# Patient Record
Sex: MALE | Race: BLACK OR AFRICAN AMERICAN | NOT HISPANIC OR LATINO | Employment: FULL TIME | RURAL
[De-identification: names, ages, dates, MRNs, and addresses within clinical notes are randomized per-mention and may not be internally consistent; named-entity substitution may affect disease eponyms.]

---

## 2022-03-15 ENCOUNTER — OFFICE VISIT (OUTPATIENT)
Dept: OTOLARYNGOLOGY | Facility: CLINIC | Age: 47
End: 2022-03-15
Payer: COMMERCIAL

## 2022-03-15 VITALS — WEIGHT: 295 LBS | HEIGHT: 71 IN | BODY MASS INDEX: 41.3 KG/M2

## 2022-03-15 DIAGNOSIS — K11.8 PAROTID MASS: Primary | ICD-10-CM

## 2022-03-15 PROCEDURE — 1160F RVW MEDS BY RX/DR IN RCRD: CPT | Mod: CPTII,,, | Performed by: OTOLARYNGOLOGY

## 2022-03-15 PROCEDURE — 99204 OFFICE O/P NEW MOD 45 MIN: CPT | Mod: S$PBB,,, | Performed by: OTOLARYNGOLOGY

## 2022-03-15 PROCEDURE — 99204 PR OFFICE/OUTPT VISIT, NEW, LEVL IV, 45-59 MIN: ICD-10-PCS | Mod: S$PBB,,, | Performed by: OTOLARYNGOLOGY

## 2022-03-15 PROCEDURE — 3008F BODY MASS INDEX DOCD: CPT | Mod: CPTII,,, | Performed by: OTOLARYNGOLOGY

## 2022-03-15 PROCEDURE — 3008F PR BODY MASS INDEX (BMI) DOCUMENTED: ICD-10-PCS | Mod: CPTII,,, | Performed by: OTOLARYNGOLOGY

## 2022-03-15 PROCEDURE — 1159F PR MEDICATION LIST DOCUMENTED IN MEDICAL RECORD: ICD-10-PCS | Mod: CPTII,,, | Performed by: OTOLARYNGOLOGY

## 2022-03-15 PROCEDURE — 99203 OFFICE O/P NEW LOW 30 MIN: CPT | Mod: PBBFAC | Performed by: OTOLARYNGOLOGY

## 2022-03-15 PROCEDURE — 1160F PR REVIEW ALL MEDS BY PRESCRIBER/CLIN PHARMACIST DOCUMENTED: ICD-10-PCS | Mod: CPTII,,, | Performed by: OTOLARYNGOLOGY

## 2022-03-15 PROCEDURE — 1159F MED LIST DOCD IN RCRD: CPT | Mod: CPTII,,, | Performed by: OTOLARYNGOLOGY

## 2022-03-15 NOTE — H&P (VIEW-ONLY)
Subjective:       Patient ID: Pablo Tabares is a 46 y.o. male.    Chief Complaint: Mass (Right sided neck mass. Pt states right neck mass has been there for years, denies pain to neck area. )    HPI  Review of Systems   HENT: Positive for facial swelling.    All other systems reviewed and are negative.      Objective:      Physical Exam  General: NAD  Head: Normocephalic, atraumatic, no facial asymmetry/normal strength,Right parotid mass 4 cm superficial Ears: Both auricules normal in appearance, w/o deformities tympanic membranes normal external auditory canals normal  Nose: External nose w/o deformities normal turbinates no drainage or inflammation  Oral Cavity: Lips, gums, floor of mouth, tongue hard palate, and buccal mucosa without mass/lesion  Oropharynx: Mucosa pink and moist, soft palate, posterior pharynx and oropharyngeal wall without mass/lesion  Neck: Supple, symmetric, trachea midline, no palpable mass/lesion, no palpable cervical lymphadenopathy  Skin: Warm and dry, no concerning lesions  Respiratory: Respirations even, unlabored    Assessment:       1. Parotid mass        Plan:       Excision in OR

## 2022-03-29 ENCOUNTER — ANESTHESIA (OUTPATIENT)
Dept: SURGERY | Facility: HOSPITAL | Age: 47
End: 2022-03-29
Payer: COMMERCIAL

## 2022-03-29 ENCOUNTER — ANESTHESIA EVENT (OUTPATIENT)
Dept: SURGERY | Facility: HOSPITAL | Age: 47
End: 2022-03-29
Payer: COMMERCIAL

## 2022-03-29 ENCOUNTER — HOSPITAL ENCOUNTER (OUTPATIENT)
Facility: HOSPITAL | Age: 47
Discharge: HOME | End: 2022-03-29
Attending: OTOLARYNGOLOGY | Admitting: OTOLARYNGOLOGY
Payer: COMMERCIAL

## 2022-03-29 VITALS
WEIGHT: 307 LBS | BODY MASS INDEX: 42.98 KG/M2 | OXYGEN SATURATION: 93 % | TEMPERATURE: 98 F | HEIGHT: 71 IN | SYSTOLIC BLOOD PRESSURE: 113 MMHG | RESPIRATION RATE: 16 BRPM | DIASTOLIC BLOOD PRESSURE: 68 MMHG | HEART RATE: 69 BPM

## 2022-03-29 DIAGNOSIS — K11.8 PAROTID MASS: Primary | ICD-10-CM

## 2022-03-29 PROCEDURE — D9220A PRA ANESTHESIA: Mod: ANES,,, | Performed by: ANESTHESIOLOGY

## 2022-03-29 PROCEDURE — 36000708 HC OR TIME LEV III 1ST 15 MIN: Performed by: OTOLARYNGOLOGY

## 2022-03-29 PROCEDURE — 37000009 HC ANESTHESIA EA ADD 15 MINS: Performed by: OTOLARYNGOLOGY

## 2022-03-29 PROCEDURE — 42420 PR EXC PAROTD,TOTAL,DISSECT 5TH NERV: ICD-10-PCS | Mod: RT,,, | Performed by: OTOLARYNGOLOGY

## 2022-03-29 PROCEDURE — 88307 SURGICAL PATHOLOGY: ICD-10-PCS | Mod: 26,,, | Performed by: PATHOLOGY

## 2022-03-29 PROCEDURE — D9220A PRA ANESTHESIA: ICD-10-PCS | Mod: CRNA,,, | Performed by: NURSE ANESTHETIST, CERTIFIED REGISTERED

## 2022-03-29 PROCEDURE — 36000709 HC OR TIME LEV III EA ADD 15 MIN: Performed by: OTOLARYNGOLOGY

## 2022-03-29 PROCEDURE — 71000033 HC RECOVERY, INTIAL HOUR: Performed by: OTOLARYNGOLOGY

## 2022-03-29 PROCEDURE — 27201423 OPTIME MED/SURG SUP & DEVICES STERILE SUPPLY: Performed by: OTOLARYNGOLOGY

## 2022-03-29 PROCEDURE — 37000008 HC ANESTHESIA 1ST 15 MINUTES: Performed by: OTOLARYNGOLOGY

## 2022-03-29 PROCEDURE — 25000003 PHARM REV CODE 250: Performed by: OTOLARYNGOLOGY

## 2022-03-29 PROCEDURE — 27000177 HC AIRWAY, LARYNGEAL MASK: Performed by: ANESTHESIOLOGY

## 2022-03-29 PROCEDURE — 71000015 HC POSTOP RECOV 1ST HR: Performed by: OTOLARYNGOLOGY

## 2022-03-29 PROCEDURE — 63600175 PHARM REV CODE 636 W HCPCS: Performed by: NURSE ANESTHETIST, CERTIFIED REGISTERED

## 2022-03-29 PROCEDURE — 42420 EXCISE PAROTID GLAND/LESION: CPT | Mod: RT,,, | Performed by: OTOLARYNGOLOGY

## 2022-03-29 PROCEDURE — 88307 TISSUE EXAM BY PATHOLOGIST: CPT | Mod: 26,,, | Performed by: PATHOLOGY

## 2022-03-29 PROCEDURE — 63600175 PHARM REV CODE 636 W HCPCS: Performed by: ANESTHESIOLOGY

## 2022-03-29 PROCEDURE — 27000260 *HC AIRWAY ORAL: Performed by: ANESTHESIOLOGY

## 2022-03-29 PROCEDURE — D9220A PRA ANESTHESIA: Mod: CRNA,,, | Performed by: NURSE ANESTHETIST, CERTIFIED REGISTERED

## 2022-03-29 PROCEDURE — 27000655: Performed by: ANESTHESIOLOGY

## 2022-03-29 PROCEDURE — 25000003 PHARM REV CODE 250: Performed by: NURSE ANESTHETIST, CERTIFIED REGISTERED

## 2022-03-29 PROCEDURE — D9220A PRA ANESTHESIA: ICD-10-PCS | Mod: ANES,,, | Performed by: ANESTHESIOLOGY

## 2022-03-29 PROCEDURE — 88307 TISSUE EXAM BY PATHOLOGIST: CPT | Mod: SUR | Performed by: OTOLARYNGOLOGY

## 2022-03-29 RX ORDER — PROPOFOL 10 MG/ML
VIAL (ML) INTRAVENOUS
Status: DISCONTINUED | OUTPATIENT
Start: 2022-03-29 | End: 2022-03-29

## 2022-03-29 RX ORDER — HYDROCODONE BITARTRATE AND ACETAMINOPHEN 7.5; 325 MG/1; MG/1
1 TABLET ORAL EVERY 6 HOURS PRN
Status: DISCONTINUED | OUTPATIENT
Start: 2022-03-29 | End: 2022-03-29 | Stop reason: HOSPADM

## 2022-03-29 RX ORDER — OXYCODONE HYDROCHLORIDE 5 MG/1
5 TABLET ORAL
Status: DISCONTINUED | OUTPATIENT
Start: 2022-03-29 | End: 2022-03-29 | Stop reason: HOSPADM

## 2022-03-29 RX ORDER — SODIUM CHLORIDE, SODIUM LACTATE, POTASSIUM CHLORIDE, CALCIUM CHLORIDE 600; 310; 30; 20 MG/100ML; MG/100ML; MG/100ML; MG/100ML
INJECTION, SOLUTION INTRAVENOUS CONTINUOUS
Status: DISCONTINUED | OUTPATIENT
Start: 2022-03-29 | End: 2022-03-29 | Stop reason: HOSPADM

## 2022-03-29 RX ORDER — HYDROMORPHONE HYDROCHLORIDE 2 MG/ML
0.5 INJECTION, SOLUTION INTRAMUSCULAR; INTRAVENOUS; SUBCUTANEOUS EVERY 5 MIN PRN
Status: DISCONTINUED | OUTPATIENT
Start: 2022-03-29 | End: 2022-03-29 | Stop reason: HOSPADM

## 2022-03-29 RX ORDER — ONDANSETRON 2 MG/ML
INJECTION INTRAMUSCULAR; INTRAVENOUS
Status: DISCONTINUED | OUTPATIENT
Start: 2022-03-29 | End: 2022-03-29

## 2022-03-29 RX ORDER — LIDOCAINE HYDROCHLORIDE AND EPINEPHRINE 10; 10 MG/ML; UG/ML
INJECTION, SOLUTION INFILTRATION; PERINEURAL
Status: DISCONTINUED | OUTPATIENT
Start: 2022-03-29 | End: 2022-03-29 | Stop reason: HOSPADM

## 2022-03-29 RX ORDER — DIPHENHYDRAMINE HYDROCHLORIDE 50 MG/ML
25 INJECTION INTRAMUSCULAR; INTRAVENOUS EVERY 6 HOURS PRN
Status: DISCONTINUED | OUTPATIENT
Start: 2022-03-29 | End: 2022-03-29 | Stop reason: HOSPADM

## 2022-03-29 RX ORDER — FENTANYL CITRATE 50 UG/ML
INJECTION, SOLUTION INTRAMUSCULAR; INTRAVENOUS
Status: DISCONTINUED | OUTPATIENT
Start: 2022-03-29 | End: 2022-03-29

## 2022-03-29 RX ORDER — ONDANSETRON 2 MG/ML
4 INJECTION INTRAMUSCULAR; INTRAVENOUS DAILY PRN
Status: DISCONTINUED | OUTPATIENT
Start: 2022-03-29 | End: 2022-03-29 | Stop reason: HOSPADM

## 2022-03-29 RX ORDER — DEXAMETHASONE SODIUM PHOSPHATE 4 MG/ML
INJECTION, SOLUTION INTRA-ARTICULAR; INTRALESIONAL; INTRAMUSCULAR; INTRAVENOUS; SOFT TISSUE
Status: DISCONTINUED | OUTPATIENT
Start: 2022-03-29 | End: 2022-03-29

## 2022-03-29 RX ORDER — LIDOCAINE HYDROCHLORIDE 20 MG/ML
INJECTION, SOLUTION EPIDURAL; INFILTRATION; INTRACAUDAL; PERINEURAL
Status: DISCONTINUED | OUTPATIENT
Start: 2022-03-29 | End: 2022-03-29

## 2022-03-29 RX ORDER — SODIUM CHLORIDE, SODIUM LACTATE, POTASSIUM CHLORIDE, CALCIUM CHLORIDE 600; 310; 30; 20 MG/100ML; MG/100ML; MG/100ML; MG/100ML
125 INJECTION, SOLUTION INTRAVENOUS CONTINUOUS
Status: DISCONTINUED | OUTPATIENT
Start: 2022-03-29 | End: 2022-03-29 | Stop reason: HOSPADM

## 2022-03-29 RX ORDER — MEPERIDINE HYDROCHLORIDE 25 MG/ML
25 INJECTION INTRAMUSCULAR; INTRAVENOUS; SUBCUTANEOUS EVERY 10 MIN PRN
Status: DISCONTINUED | OUTPATIENT
Start: 2022-03-29 | End: 2022-03-29 | Stop reason: HOSPADM

## 2022-03-29 RX ORDER — CEFAZOLIN SODIUM 1 G/3ML
INJECTION, POWDER, FOR SOLUTION INTRAMUSCULAR; INTRAVENOUS
Status: DISCONTINUED | OUTPATIENT
Start: 2022-03-29 | End: 2022-03-29

## 2022-03-29 RX ORDER — MIDAZOLAM HYDROCHLORIDE 1 MG/ML
INJECTION INTRAMUSCULAR; INTRAVENOUS
Status: DISCONTINUED | OUTPATIENT
Start: 2022-03-29 | End: 2022-03-29

## 2022-03-29 RX ORDER — MORPHINE SULFATE 10 MG/ML
4 INJECTION INTRAMUSCULAR; INTRAVENOUS; SUBCUTANEOUS EVERY 5 MIN PRN
Status: DISCONTINUED | OUTPATIENT
Start: 2022-03-29 | End: 2022-03-29 | Stop reason: HOSPADM

## 2022-03-29 RX ADMIN — CEFAZOLIN 3 G: 1 INJECTION, POWDER, FOR SOLUTION INTRAMUSCULAR; INTRAVENOUS; PARENTERAL at 07:03

## 2022-03-29 RX ADMIN — FENTANYL CITRATE 50 MCG: 50 INJECTION INTRAMUSCULAR; INTRAVENOUS at 07:03

## 2022-03-29 RX ADMIN — SODIUM CHLORIDE, POTASSIUM CHLORIDE, SODIUM LACTATE AND CALCIUM CHLORIDE: 600; 310; 30; 20 INJECTION, SOLUTION INTRAVENOUS at 07:03

## 2022-03-29 RX ADMIN — MIDAZOLAM 2 MG: 1 INJECTION INTRAMUSCULAR; INTRAVENOUS at 07:03

## 2022-03-29 RX ADMIN — ONDANSETRON 4 MG: 2 INJECTION INTRAMUSCULAR; INTRAVENOUS at 07:03

## 2022-03-29 RX ADMIN — LIDOCAINE HYDROCHLORIDE 60 MG: 20 INJECTION, SOLUTION INTRAVENOUS at 07:03

## 2022-03-29 RX ADMIN — DEXAMETHASONE SODIUM PHOSPHATE 12 MG: 4 INJECTION, SOLUTION INTRA-ARTICULAR; INTRALESIONAL; INTRAMUSCULAR; INTRAVENOUS; SOFT TISSUE at 07:03

## 2022-03-29 RX ADMIN — PROPOFOL 200 MG: 10 INJECTION, EMULSION INTRAVENOUS at 07:03

## 2022-03-29 NOTE — PROGRESS NOTES
REC'ED TO RR WITH ORAL AIRWAY IN PLACE. O2 VIA FM. O2 SAT 92%. CO2 MONITORING IN PROGRESS MID 40'S. OP-SITE RIGHT BELOW LEFT EAR APPROXIMATED WITH LIQUID ADHESIVE DRESSING.  IV INFUSING WELL LEFT HAND 20G. CATH. OBSERVING CLOSELY. SEE FLOW SHEET.

## 2022-03-29 NOTE — TRANSFER OF CARE
"Anesthesia Transfer of Care Note    Patient: Pablo Tabares    Procedure(s) Performed: Procedure(s) (LRB):  EXCISION, PAROTID GLAND (Right)    Patient location: PACU    Anesthesia Type: general    Transport from OR: Transported from OR on 6-10 L/min O2 by face mask with adequate spontaneous ventilation    Post pain: adequate analgesia    Post assessment: no apparent anesthetic complications    Post vital signs: stable    Level of consciousness: awake and alert    Nausea/Vomiting: no nausea/vomiting    Complications: none    Transfer of care protocol was followed      Last vitals:   Visit Vitals  BP (!) 155/96   Pulse 78   Temp 36.7 °C (98.1 °F) (Oral)   Resp 16   Ht 5' 11" (1.803 m)   Wt (!) 139.3 kg (307 lb)   SpO2 (!) 94%   BMI 42.82 kg/m²     "

## 2022-03-29 NOTE — ANESTHESIA PREPROCEDURE EVALUATION
03/29/2022  Pablo Tabares is a 46 y.o., male.      Pre-op Assessment    I have reviewed the Patient Summary Reports.     I have reviewed the Nursing Notes. I have reviewed the NPO Status.   I have reviewed the Medications.     Review of Systems  Anesthesia Hx:  No problems with previous Anesthesia    Social:  No Alcohol Use, Smoker    Hematology/Oncology:  Hematology Normal   Oncology Normal     EENT/Dental:EENT/Dental Normal   Cardiovascular:  Cardiovascular Normal     Pulmonary:  Pulmonary Normal    Renal/:  Renal/ Normal     Hepatic/GI:  Hepatic/GI Normal    Musculoskeletal:  Musculoskeletal Normal    Neurological:  Neurology Normal    Endocrine:  Endocrine Normal  Morbid Obesity / BMI > 40  Dermatological:  Skin Normal    Psych:  Psychiatric Normal           Physical Exam  General: Well nourished    Airway:  Mallampati: III / III  Mouth Opening: Normal  TM Distance: > 6 cm  Tongue: Normal  Neck ROM: Normal ROM    Chest/Lungs:  Clear to auscultation, Normal Respiratory Rate    Heart:  Rate: Normal  Rhythm: Regular Rhythm        Anesthesia Plan  Type of Anesthesia, risks & benefits discussed:    Anesthesia Type: Gen ETT  Intra-op Monitoring Plan: Standard ASA Monitors  Post Op Pain Control Plan: multimodal analgesia  Induction:  IV  Informed Consent: Informed consent signed with the Patient and all parties understand the risks and agree with anesthesia plan.  All questions answered. Patient consented to blood products? Yes  ASA Score: 2  Day of Surgery Review of History & Physical: H&P Update referred to the surgeon/provider.I have interviewed and examined the patient. I have reviewed the patient's H&P dated: There are no significant changes. H&P completed by Anesthesiologist.    Ready For Surgery From Anesthesia Perspective.     .

## 2022-03-29 NOTE — ANESTHESIA PROCEDURE NOTES
Intubation    Date/Time: 3/29/2022 7:20 AM  Performed by: Emily Charlton CRNA  Authorized by: Jaguar Watson MD     Intubation:     Induction:  Intravenous    Intubated:  Postinduction    Mask Ventilation:  N/a    Attempts:  1    Attempted By:  CRNA    Method of Intubation:  Other (see comments)    Difficult Airway Encountered?: No      Complications:  None    Airway Device:  Supraglottic airway/LMA    Airway Device Size:  5.0    Style/Cuff Inflation:  Cuffed (inflated to minimal occlusive pressure)    Inflation Amount (mL):  10    Placement Verified By:  Capnometry    Complicating Factors:  None    Findings Post-Intubation:  BS equal bilateral and atraumatic/condition of teeth unchanged

## 2022-03-29 NOTE — ANESTHESIA POSTPROCEDURE EVALUATION
Anesthesia Post Evaluation    Patient: Pablo Tabares    Procedure(s) Performed: Procedure(s) (LRB):  EXCISION, PAROTID GLAND (Right)    Final Anesthesia Type: general      Patient location during evaluation: PACU  Patient participation: Yes- Able to Participate  Level of consciousness: awake and sedated  Post-procedure vital signs: reviewed and stable  Pain management: adequate  Airway patency: patent    PONV status at discharge: No PONV  Anesthetic complications: no      Cardiovascular status: blood pressure returned to baseline  Respiratory status: unassisted  Hydration status: euvolemic  Follow-up not needed.          Vitals Value Taken Time   /68 03/29/22 0900   Temp 36.7 °C (98 °F) 03/29/22 0900   Pulse 68 03/29/22 0907   Resp 16 03/29/22 0835   SpO2 94 % 03/29/22 0907   Vitals shown include unvalidated device data.      Event Time   Out of Recovery 08:37:00         Pain/Jose Luis Score: Jose Luis Score: 9 (3/29/2022  8:30 AM)

## 2022-03-29 NOTE — PROGRESS NOTES
RELEASED TO [facility] RN. AWAKE,ALERT. NO C/O PAIN. NO BLEEDING OR SWELLING AT OP-SITE.  V/S 126/65-70-16-97%. NO DISTRESS NOTED.

## 2022-03-29 NOTE — OP NOTE
After general anesthesia the neck was prepped and draped in a sterile fashion. It was injected with 1% xylocaine with epi 1:100,000 a horizontal skin crease incision was made down through platysma muscle the large superficial and deep lobe parotid mass was dissected from the surrounding parotid tissue  the branch of facial nerve was dissected and reflected superiorly and preserved ; the mass was then removed we obtained  meticulous hemostasis it was sent to pathology for permanent section. The patient tolerated the procedure well was taken to  in stable condition.

## 2022-03-31 LAB
DHEA SERPL-MCNC: NORMAL
ESTROGEN SERPL-MCNC: NORMAL PG/ML
INSULIN SERPL-ACNC: NORMAL U[IU]/ML
LAB AP GROSS DESCRIPTION: NORMAL
LAB AP LABORATORY NOTES: NORMAL
T3RU NFR SERPL: NORMAL %

## 2022-04-05 ENCOUNTER — OFFICE VISIT (OUTPATIENT)
Dept: OTOLARYNGOLOGY | Facility: CLINIC | Age: 47
End: 2022-04-05
Payer: COMMERCIAL

## 2022-04-05 VITALS — WEIGHT: 307 LBS | BODY MASS INDEX: 42.98 KG/M2 | HEIGHT: 71 IN

## 2022-04-05 DIAGNOSIS — D11.0 PLEOMORPHIC ADENOMA OF PAROTID GLAND: Primary | ICD-10-CM

## 2022-04-05 PROCEDURE — 99213 OFFICE O/P EST LOW 20 MIN: CPT | Mod: PBBFAC | Performed by: OTOLARYNGOLOGY

## 2022-04-05 PROCEDURE — 99024 POSTOP FOLLOW-UP VISIT: CPT | Mod: ,,, | Performed by: OTOLARYNGOLOGY

## 2022-04-05 PROCEDURE — 1160F RVW MEDS BY RX/DR IN RCRD: CPT | Mod: CPTII,,, | Performed by: OTOLARYNGOLOGY

## 2022-04-05 PROCEDURE — 1159F PR MEDICATION LIST DOCUMENTED IN MEDICAL RECORD: ICD-10-PCS | Mod: CPTII,,, | Performed by: OTOLARYNGOLOGY

## 2022-04-05 PROCEDURE — 1159F MED LIST DOCD IN RCRD: CPT | Mod: CPTII,,, | Performed by: OTOLARYNGOLOGY

## 2022-04-05 PROCEDURE — 3008F PR BODY MASS INDEX (BMI) DOCUMENTED: ICD-10-PCS | Mod: CPTII,,, | Performed by: OTOLARYNGOLOGY

## 2022-04-05 PROCEDURE — 99024 PR POST-OP FOLLOW-UP VISIT: ICD-10-PCS | Mod: ,,, | Performed by: OTOLARYNGOLOGY

## 2022-04-05 PROCEDURE — 1160F PR REVIEW ALL MEDS BY PRESCRIBER/CLIN PHARMACIST DOCUMENTED: ICD-10-PCS | Mod: CPTII,,, | Performed by: OTOLARYNGOLOGY

## 2022-04-05 PROCEDURE — 3008F BODY MASS INDEX DOCD: CPT | Mod: CPTII,,, | Performed by: OTOLARYNGOLOGY

## 2022-04-05 NOTE — PROGRESS NOTES
Subjective:       Patient ID: Pablo Tabares is a 46 y.o. male.    Chief Complaint: Follow-up (Patient is a post op. He states he was sick Thursday and Friday but is now doing well.)    HPI  Review of Systems    Objective:      Physical Exam  Healing well some swelling hematoma   Assessment:       1. Pleomorphic adenoma of parotid gland        Plan:       F/u 6 weeks

## 2022-05-17 ENCOUNTER — OFFICE VISIT (OUTPATIENT)
Dept: OTOLARYNGOLOGY | Facility: CLINIC | Age: 47
End: 2022-05-17
Payer: COMMERCIAL

## 2022-05-17 VITALS — BODY MASS INDEX: 42.98 KG/M2 | HEIGHT: 71 IN | WEIGHT: 307 LBS

## 2022-05-17 DIAGNOSIS — D11.0 PLEOMORPHIC ADENOMA OF PAROTID GLAND: Primary | ICD-10-CM

## 2022-05-17 PROCEDURE — 99024 PR POST-OP FOLLOW-UP VISIT: ICD-10-PCS | Mod: S$PBB,,, | Performed by: OTOLARYNGOLOGY

## 2022-05-17 PROCEDURE — 1160F RVW MEDS BY RX/DR IN RCRD: CPT | Mod: CPTII,,, | Performed by: OTOLARYNGOLOGY

## 2022-05-17 PROCEDURE — 99024 POSTOP FOLLOW-UP VISIT: CPT | Mod: S$PBB,,, | Performed by: OTOLARYNGOLOGY

## 2022-05-17 PROCEDURE — 1160F PR REVIEW ALL MEDS BY PRESCRIBER/CLIN PHARMACIST DOCUMENTED: ICD-10-PCS | Mod: CPTII,,, | Performed by: OTOLARYNGOLOGY

## 2022-05-17 PROCEDURE — 1159F MED LIST DOCD IN RCRD: CPT | Mod: CPTII,,, | Performed by: OTOLARYNGOLOGY

## 2022-05-17 PROCEDURE — 3008F PR BODY MASS INDEX (BMI) DOCUMENTED: ICD-10-PCS | Mod: CPTII,,, | Performed by: OTOLARYNGOLOGY

## 2022-05-17 PROCEDURE — 3008F BODY MASS INDEX DOCD: CPT | Mod: CPTII,,, | Performed by: OTOLARYNGOLOGY

## 2022-05-17 PROCEDURE — 1159F PR MEDICATION LIST DOCUMENTED IN MEDICAL RECORD: ICD-10-PCS | Mod: CPTII,,, | Performed by: OTOLARYNGOLOGY

## 2022-05-17 PROCEDURE — 99213 OFFICE O/P EST LOW 20 MIN: CPT | Mod: PBBFAC | Performed by: OTOLARYNGOLOGY

## 2022-05-17 NOTE — PROGRESS NOTES
Subjective:       Patient ID: Pablo Tabares is a 46 y.o. male.    Chief Complaint: Mass (Right neck mass, pt states it is better, but not completely gone. )    HPI  Review of Systems   Constitutional: Negative for chills, fatigue and fever.   HENT: Negative for facial swelling.          Objective:      Physical Exam  Constitutional:       Appearance: Normal appearance. He is obese.   HENT:      Head: Normocephalic.      Right Ear: Ear canal and external ear normal.      Left Ear: Ear canal and external ear normal.      Nose: Nose normal.      Mouth/Throat:      Lips: Pink.      Mouth: Mucous membranes are moist.      Pharynx: Oropharynx is clear.   Pulmonary:      Effort: Pulmonary effort is normal.   Skin:     General: Skin is warm and dry.   Neurological:      Mental Status: He is alert.   Psychiatric:         Mood and Affect: Mood normal.         Behavior: Behavior is cooperative.       some scar tissue right scar area  Assessment:       Problem List Items Addressed This Visit    None     Visit Diagnoses     Pleomorphic adenoma of parotid gland    -  Primary          Plan:   1. Follow up as needed.when enlarges

## 2022-09-13 ENCOUNTER — CLINICAL SUPPORT (OUTPATIENT)
Dept: PRIMARY CARE CLINIC | Facility: CLINIC | Age: 47
End: 2022-09-13

## 2022-09-13 DIAGNOSIS — Z02.4 ENCOUNTER FOR DEPARTMENT OF TRANSPORTATION (DOT) EXAMINATION FOR DRIVING LICENSE RENEWAL: Primary | ICD-10-CM

## 2022-09-13 PROCEDURE — 99499 UNLISTED E&M SERVICE: CPT | Mod: ,,, | Performed by: NURSE PRACTITIONER

## 2022-09-13 PROCEDURE — 99499 PR PHYSICAL - DOT/CDL: ICD-10-PCS | Mod: ,,, | Performed by: NURSE PRACTITIONER

## 2022-09-13 NOTE — PROGRESS NOTES
Subjective:       Patient ID: Pablo Tabares is a 47 y.o. male.    Chief Complaint: No chief complaint on file.    HPI  Review of Systems      Objective:      Physical Exam    Assessment:       Problem List Items Addressed This Visit    None  Visit Diagnoses       Encounter for Department of Transportation (DOT) examination for driving license renewal    -  Primary            Plan:       See scanned documents in .

## 2024-01-18 ENCOUNTER — OFFICE VISIT (OUTPATIENT)
Dept: FAMILY MEDICINE | Facility: CLINIC | Age: 49
End: 2024-01-18

## 2024-01-18 ENCOUNTER — HOSPITAL ENCOUNTER (OUTPATIENT)
Dept: RADIOLOGY | Facility: HOSPITAL | Age: 49
Discharge: HOME | End: 2024-01-18
Attending: NURSE PRACTITIONER

## 2024-01-18 VITALS
BODY MASS INDEX: 43.4 KG/M2 | SYSTOLIC BLOOD PRESSURE: 144 MMHG | HEART RATE: 90 BPM | WEIGHT: 310 LBS | HEIGHT: 71 IN | TEMPERATURE: 98 F | OXYGEN SATURATION: 97 % | DIASTOLIC BLOOD PRESSURE: 77 MMHG

## 2024-01-18 DIAGNOSIS — S82.831A CLOSED FRACTURE OF DISTAL END OF RIGHT FIBULA, UNSPECIFIED FRACTURE MORPHOLOGY, INITIAL ENCOUNTER: Primary | ICD-10-CM

## 2024-01-18 DIAGNOSIS — M25.571 ACUTE RIGHT ANKLE PAIN: ICD-10-CM

## 2024-01-18 PROCEDURE — 73610 X-RAY EXAM OF ANKLE: CPT | Mod: TC,RT

## 2024-01-18 PROCEDURE — 73610 X-RAY EXAM OF ANKLE: CPT | Mod: 26,RT,, | Performed by: STUDENT IN AN ORGANIZED HEALTH CARE EDUCATION/TRAINING PROGRAM

## 2024-01-18 PROCEDURE — 99203 OFFICE O/P NEW LOW 30 MIN: CPT | Mod: ,,, | Performed by: NURSE PRACTITIONER

## 2024-01-18 RX ORDER — IBUPROFEN 800 MG/1
800 TABLET ORAL EVERY 8 HOURS PRN
Qty: 60 TABLET | Refills: 0 | Status: SHIPPED | OUTPATIENT
Start: 2024-01-18

## 2024-01-18 RX ORDER — HYDROCODONE BITARTRATE AND ACETAMINOPHEN 7.5; 325 MG/1; MG/1
1 TABLET ORAL EVERY 6 HOURS PRN
Qty: 14 TABLET | Refills: 0 | Status: SHIPPED | OUTPATIENT
Start: 2024-01-18 | End: 2024-02-05

## 2024-01-18 NOTE — PROGRESS NOTES
"Subjective:       Patient ID: Pablo Tabares is a 48 y.o. male.    Chief Complaint: Ankle Injury (Pt states that he fell on 01/17 and is now having pain and swelling in R ankle.)    HPI  Review of Systems   Constitutional: Negative.    Respiratory: Negative.     Cardiovascular: Negative.    Musculoskeletal:  Positive for falls and joint pain.          Reviewed family, medical, surgical, and social history.    Objective:      BP (!) 144/77 (BP Location: Right arm, Patient Position: Sitting, BP Method: X-Large (Automatic))   Pulse 90   Temp 98.1 °F (36.7 °C) (Oral)   Ht 5' 11" (1.803 m)   Wt (!) 140.6 kg (310 lb)   SpO2 97%   BMI 43.24 kg/m²   Physical Exam  Vitals and nursing note reviewed.   Constitutional:       General: He is not in acute distress.     Appearance: Normal appearance. He is obese. He is not ill-appearing, toxic-appearing or diaphoretic.   Cardiovascular:      Rate and Rhythm: Normal rate and regular rhythm.      Heart sounds: Normal heart sounds.   Pulmonary:      Effort: Pulmonary effort is normal.      Breath sounds: Normal breath sounds.   Musculoskeletal:      Right ankle: Swelling and ecchymosis present. No deformity or lacerations. Tenderness present over the lateral malleolus. Decreased range of motion. Normal pulse.      Right Achilles Tendon: Normal. No tenderness or defects. Charlton's test negative.      Comments: Pain and swelling to right lateral ankle. Severe pain with ROM. Charlton's test negative. Normal sensation of toes. Normal pulses.    Skin:     General: Skin is warm and dry.      Capillary Refill: Capillary refill takes less than 2 seconds.   Neurological:      Mental Status: He is alert and oriented to person, place, and time.   Psychiatric:         Mood and Affect: Mood normal.         Behavior: Behavior normal.         Thought Content: Thought content normal.         Judgment: Judgment normal.            No visits with results within 1 Day(s) from this visit.   Latest " known visit with results is:   Admission on 03/29/2022, Discharged on 03/29/2022   Component Date Value Ref Range Status    Case Report 03/29/2022    Final                    Value:Surgical Pathology                                Case: [id number]                                   Authorizing Provider:  Hari Mattson MD        Collected:           03/29/2022 07:38 AM          Ordering Location:     [facility], [place]   Received:            03/29/2022 09:48 AM                                 Services                                                                     Pathologist:           Osmar Rodriguez MD                                                       Specimen:    Salivary Gland, right parotid                                                              Final Diagnosis 03/29/2022    Final                    Value:This result contains rich text formatting which cannot be displayed here.    Comments 03/29/2022    Final                    Value:This result contains rich text formatting which cannot be displayed here.    Gross Description 03/29/2022    Final                    Value:This result contains rich text formatting which cannot be displayed here.    Microscopic Description 03/29/2022    Final                    Value:This result contains rich text formatting which cannot be displayed here.    Laboratory Notes 03/29/2022    Final                    Value:This result contains rich text formatting which cannot be displayed here.      Assessment:       1. Closed fracture of distal end of right fibula, unspecified fracture morphology, initial encounter    2. Acute right ankle pain        Plan:       Closed fracture of distal end of right fibula, unspecified fracture morphology, initial encounter  -     Ambulatory referral/consult to Orthopedics; Future; Expected date: 01/25/2024    Acute right ankle pain  -     X-Ray Ankle Complete 3 View Right; Future; Expected date: 01/18/2024  -     ibuprofen  (ADVIL,MOTRIN) 800 MG tablet; Take 1 tablet (800 mg total) by mouth every 8 (eight) hours as needed for Pain.  Dispense: 60 tablet; Refill: 0  -     HYDROcodone-acetaminophen (NORCO) 7.5-325 mg per tablet; Take 1 tablet by mouth every 6 (six) hours as needed for Pain (severe right ankle pain).  Dispense: 14 tablet; Refill: 0    F/U with us if ortho has not called you by Monday at noon  RTC PRN          Risks, benefits, and side effects were discussed with the patient. All questions were answered to the fullest satisfaction of the patient, and pt verbalized understanding and agreement to treatment plan. Pt was to call with any new or worsening symptoms, or present to the ER.

## 2024-01-22 ENCOUNTER — OFFICE VISIT (OUTPATIENT)
Dept: ORTHOPEDICS | Facility: CLINIC | Age: 49
End: 2024-01-22

## 2024-01-22 ENCOUNTER — CLINICAL SUPPORT (OUTPATIENT)
Dept: CARDIOLOGY | Facility: CLINIC | Age: 49
End: 2024-01-22

## 2024-01-22 ENCOUNTER — HOSPITAL ENCOUNTER (OUTPATIENT)
Dept: RADIOLOGY | Facility: HOSPITAL | Age: 49
Discharge: HOME | End: 2024-01-22
Attending: ORTHOPAEDIC SURGERY

## 2024-01-22 DIAGNOSIS — M25.571 ACUTE RIGHT ANKLE PAIN: Primary | ICD-10-CM

## 2024-01-22 DIAGNOSIS — Z01.810 PRE-OPERATIVE CARDIOVASCULAR EXAMINATION: ICD-10-CM

## 2024-01-22 DIAGNOSIS — S82.831A CLOSED FRACTURE OF DISTAL END OF RIGHT FIBULA, UNSPECIFIED FRACTURE MORPHOLOGY, INITIAL ENCOUNTER: ICD-10-CM

## 2024-01-22 DIAGNOSIS — S82.891A CLOSED FRACTURE OF RIGHT ANKLE, INITIAL ENCOUNTER: Primary | ICD-10-CM

## 2024-01-22 DIAGNOSIS — M25.571 ACUTE RIGHT ANKLE PAIN: ICD-10-CM

## 2024-01-22 PROCEDURE — 93010 ELECTROCARDIOGRAM REPORT: CPT | Mod: S$PBB,,, | Performed by: HOSPITALIST

## 2024-01-22 PROCEDURE — 99212 OFFICE O/P EST SF 10 MIN: CPT | Mod: PBBFAC,25

## 2024-01-22 PROCEDURE — 73610 X-RAY EXAM OF ANKLE: CPT | Mod: 26,RT,, | Performed by: ORTHOPAEDIC SURGERY

## 2024-01-22 PROCEDURE — 73610 X-RAY EXAM OF ANKLE: CPT | Mod: TC,RT

## 2024-01-22 PROCEDURE — 99213 OFFICE O/P EST LOW 20 MIN: CPT | Mod: PBBFAC | Performed by: ORTHOPAEDIC SURGERY

## 2024-01-22 PROCEDURE — 99204 OFFICE O/P NEW MOD 45 MIN: CPT | Mod: S$PBB,57,, | Performed by: ORTHOPAEDIC SURGERY

## 2024-01-22 PROCEDURE — 93005 ELECTROCARDIOGRAM TRACING: CPT | Mod: PBBFAC | Performed by: HOSPITALIST

## 2024-01-22 NOTE — PROGRESS NOTES
Radiology Interpretation        Patient Name: Pablo Tabares  Date: 1/22/2024  YOB: 1975  MRN# [id number]        ORDERING DIAGNOSIS:    Encounter Diagnoses   Name Primary?    Closed fracture of distal end of right fibula, unspecified fracture morphology, initial encounter     Closed fracture of right ankle, initial encounter Yes      Three views AP lateral mortise views right ankle skeletally mature individual there is a fracture of the distal fibula there is widening of the mortise displacement of the fracture impression displaced fracture right distal fibula with widening of the mortise               Ian Dorman MD

## 2024-01-22 NOTE — PATIENT INSTRUCTIONS
Your surgery is scheduled at [facility] in [place] for 2024     Pre-Op Testin2024    _______ Lab (Clinic Lab 1st Floor)  _______ Chest X-ray ([facility] 1st Floor)  ___x____ EKG (Clinic 2nd Floor)    Our office will contact you the day before surgery to give you  the arrival time.    *Do NOT eat or drink anything after midnight the night before your surgery.    *Bring all medications in their original bottles.    *Bring anything you may need for an overnight stay.     *Bathe with Hibiclens the night or morning before surgery.    *The morning of your surgery ONLY take blood pressure medications, heart medications, medications for acid reflux, and thyroid medications (the morning dose only).  *Take these medications with a sip of water.    *Do not take Insulin or Diabetic Medications the night before or the morning of your surgery, unless directed otherwise.    *Be sure that you have stopped blood thinners at the appropriate time, as instructed.  (_____ days prior to surgery)    *Bring your C-Pap machine if you have one.    *All jewelry and piercings MUST be removed prior to surgery.    *False eye lashes must be removed prior to surgery.    *Any questions regarding co-pays or deductibles with insurance, please contact the [facility]or[facility] at #[phone].    *For Financial Assistance you may call #[phone] or #[phone].    Thank you for choosing Dr. Ian Dorman for your Orthopedic needs.  We look forward to caring for you. If you have any questions, please contact our office at [phone].

## 2024-01-22 NOTE — PROGRESS NOTES
Clinic Note        CC:   Chief Complaint   Patient presents with    Right Ankle - Pain        Principal problem: Closed fracture of right ankle, initial encounter [S87.380C]     REASON FOR VISIT:       HISTORY:  48-year-old male who sustained a twisting injury to his right ankle 4 days ago was noted to have a fracture was distal fibula he has some widening of the mortise he denies any numbness or tingling      PAST MEDICAL HISTORY: History reviewed. No pertinent past medical history.       PAST SURGICAL HISTORY:   Past Surgical History:   Procedure Laterality Date    EXCISION OF PAROTID GLAND Right 3/29/2022    Procedure: EXCISION, PAROTID GLAND;  Surgeon: Hari Mattson MD;  Location: [facility];  Service: ENT;  Laterality: Right;          ALLERGIES:   Review of patient's allergies indicates:   Allergen Reactions    Shrimp         MEDICATIONS :    Current Outpatient Medications:     HYDROcodone-acetaminophen (NORCO) 7.5-325 mg per tablet, Take 1 tablet by mouth every 6 (six) hours as needed for Pain (severe right ankle pain)., Disp: 14 tablet, Rfl: 0    ibuprofen (ADVIL,MOTRIN) 800 MG tablet, Take 1 tablet (800 mg total) by mouth every 8 (eight) hours as needed for Pain., Disp: 60 tablet, Rfl: 0     SOCIAL HISTORY:   Social History     Socioeconomic History    Marital status: Single   Tobacco Use    Smoking status: Never    Smokeless tobacco: Never   Substance and Sexual Activity    Alcohol use: Not Currently          FAMILY HISTORY: History reviewed. No pertinent family history.       PHYSICAL EXAM:  In general, this is a well-developed, well-nourished male . The patient is alert, oriented and cooperative.      HEENT:  Normocephalic, atraumatic.  Extraocular movements are intact bilaterally.  The oropharynx is benign.       NECK:  Nontender with good range of motion.      LUNGS:  Clear to auscultation bilaterally.      HEART:  Demonstrates a regular rate and rhythm.  No murmurs  appreciated.      ABDOMEN:  Soft, non-tender, non-distended.        EXTREMITIES:  Right lower extremity moves his toes has sensation to touch has palpable pulses tender palpation over his distal fibula.  There is swelling and ecchymosis noted he is tender over his medial malleolus as well no crepitus noted      RADIOGRAPHIC FINDINGS:  X-rays show fracture of the distal fibula with widening of the mortise      IMPRESSION: Closed fracture of right ankle, initial encounter    Closed fracture of distal end of right fibula, unspecified fracture morphology, initial encounter  -     Ambulatory referral/consult to Orthopedics         PLAN:  We discussed treatment options including risks and benefits of surgery.  We are going to plan on doing an open reduction internal fixation of his right ankle.  Will set this up index 1-2 days he started having widening of the mortise and subluxing his ankle this is widening and increasing since his previous x-rays 4 days ago.  There are no Patient Instructions on file for this visit.      No follow-ups on file.         Ian Dorman      (Subject to voice recognition error, transcription service not allowed)

## 2024-01-23 ENCOUNTER — HOSPITAL ENCOUNTER (OUTPATIENT)
Facility: HOSPITAL | Age: 49
Discharge: HOME | End: 2024-01-23
Attending: ORTHOPAEDIC SURGERY | Admitting: ORTHOPAEDIC SURGERY

## 2024-01-23 ENCOUNTER — ANESTHESIA EVENT (OUTPATIENT)
Dept: SURGERY | Facility: HOSPITAL | Age: 49
End: 2024-01-23

## 2024-01-23 ENCOUNTER — ANESTHESIA (OUTPATIENT)
Dept: SURGERY | Facility: HOSPITAL | Age: 49
End: 2024-01-23

## 2024-01-23 VITALS
BODY MASS INDEX: 43.4 KG/M2 | OXYGEN SATURATION: 93 % | WEIGHT: 310 LBS | HEART RATE: 69 BPM | DIASTOLIC BLOOD PRESSURE: 76 MMHG | SYSTOLIC BLOOD PRESSURE: 132 MMHG | HEIGHT: 71 IN | TEMPERATURE: 99 F

## 2024-01-23 DIAGNOSIS — S82.891A CLOSED FRACTURE OF RIGHT ANKLE: ICD-10-CM

## 2024-01-23 PROCEDURE — 27201423 OPTIME MED/SURG SUP & DEVICES STERILE SUPPLY: Performed by: ORTHOPAEDIC SURGERY

## 2024-01-23 PROCEDURE — 63600175 PHARM REV CODE 636 W HCPCS: Performed by: NURSE ANESTHETIST, CERTIFIED REGISTERED

## 2024-01-23 PROCEDURE — D9220A PRA ANESTHESIA: Mod: ,,, | Performed by: ANESTHESIOLOGY

## 2024-01-23 PROCEDURE — 36000708 HC OR TIME LEV III 1ST 15 MIN: Performed by: ORTHOPAEDIC SURGERY

## 2024-01-23 PROCEDURE — 63600175 PHARM REV CODE 636 W HCPCS: Performed by: ORTHOPAEDIC SURGERY

## 2024-01-23 PROCEDURE — 27792 TREATMENT OF ANKLE FRACTURE: CPT | Mod: RT,,, | Performed by: ORTHOPAEDIC SURGERY

## 2024-01-23 PROCEDURE — C1713 ANCHOR/SCREW BN/BN,TIS/BN: HCPCS | Performed by: ORTHOPAEDIC SURGERY

## 2024-01-23 PROCEDURE — 25000003 PHARM REV CODE 250: Performed by: NURSE ANESTHETIST, CERTIFIED REGISTERED

## 2024-01-23 PROCEDURE — 37000009 HC ANESTHESIA EA ADD 15 MINS: Performed by: ORTHOPAEDIC SURGERY

## 2024-01-23 PROCEDURE — 71000033 HC RECOVERY, INTIAL HOUR: Performed by: ORTHOPAEDIC SURGERY

## 2024-01-23 PROCEDURE — 25000003 PHARM REV CODE 250: Performed by: ORTHOPAEDIC SURGERY

## 2024-01-23 PROCEDURE — 36000709 HC OR TIME LEV III EA ADD 15 MIN: Performed by: ORTHOPAEDIC SURGERY

## 2024-01-23 PROCEDURE — 71000015 HC POSTOP RECOV 1ST HR: Performed by: ORTHOPAEDIC SURGERY

## 2024-01-23 PROCEDURE — 97161 PT EVAL LOW COMPLEX 20 MIN: CPT

## 2024-01-23 PROCEDURE — 37000008 HC ANESTHESIA 1ST 15 MINUTES: Performed by: ORTHOPAEDIC SURGERY

## 2024-01-23 DEVICE — SCREW BONE LOCK T10 3.5X16MM: Type: IMPLANTABLE DEVICE | Site: ANKLE | Status: FUNCTIONAL

## 2024-01-23 DEVICE — PLATE BONE FIB VARIAX LAT DIST: Type: IMPLANTABLE DEVICE | Site: ANKLE | Status: FUNCTIONAL

## 2024-01-23 DEVICE — SCREW BONE NON LOCK 3.5X14MM: Type: IMPLANTABLE DEVICE | Site: ANKLE | Status: FUNCTIONAL

## 2024-01-23 DEVICE — SCREW BONE LOCK T10 3.5X14MM: Type: IMPLANTABLE DEVICE | Site: ANKLE | Status: FUNCTIONAL

## 2024-01-23 RX ORDER — MORPHINE SULFATE 10 MG/ML
4 INJECTION INTRAMUSCULAR; INTRAVENOUS; SUBCUTANEOUS EVERY 5 MIN PRN
Status: DISCONTINUED | OUTPATIENT
Start: 2024-01-23 | End: 2024-01-23 | Stop reason: HOSPADM

## 2024-01-23 RX ORDER — IPRATROPIUM BROMIDE AND ALBUTEROL SULFATE 2.5; .5 MG/3ML; MG/3ML
3 SOLUTION RESPIRATORY (INHALATION) ONCE
Status: DISCONTINUED | OUTPATIENT
Start: 2024-01-23 | End: 2024-01-23 | Stop reason: HOSPADM

## 2024-01-23 RX ORDER — HYDROCODONE BITARTRATE AND ACETAMINOPHEN 10; 325 MG/1; MG/1
1 TABLET ORAL EVERY 6 HOURS PRN
Qty: 28 TABLET | Refills: 0 | Status: SHIPPED | OUTPATIENT
Start: 2024-01-23 | End: 2024-02-05

## 2024-01-23 RX ORDER — HYDROMORPHONE HYDROCHLORIDE 2 MG/ML
0.5 INJECTION, SOLUTION INTRAMUSCULAR; INTRAVENOUS; SUBCUTANEOUS EVERY 5 MIN PRN
Status: DISCONTINUED | OUTPATIENT
Start: 2024-01-23 | End: 2024-01-23 | Stop reason: HOSPADM

## 2024-01-23 RX ORDER — ACETAMINOPHEN 500 MG
1000 TABLET ORAL EVERY 6 HOURS PRN
Status: DISCONTINUED | OUTPATIENT
Start: 2024-01-23 | End: 2024-01-23 | Stop reason: HOSPADM

## 2024-01-23 RX ORDER — HYDROMORPHONE HYDROCHLORIDE 2 MG/ML
INJECTION, SOLUTION INTRAMUSCULAR; INTRAVENOUS; SUBCUTANEOUS
Status: DISCONTINUED | OUTPATIENT
Start: 2024-01-23 | End: 2024-01-23

## 2024-01-23 RX ORDER — PROMETHAZINE HYDROCHLORIDE 25 MG/1
25 TABLET ORAL EVERY 6 HOURS PRN
Status: DISCONTINUED | OUTPATIENT
Start: 2024-01-23 | End: 2024-01-23 | Stop reason: HOSPADM

## 2024-01-23 RX ORDER — MEPERIDINE HYDROCHLORIDE 25 MG/ML
25 INJECTION INTRAMUSCULAR; INTRAVENOUS; SUBCUTANEOUS EVERY 10 MIN PRN
Status: DISCONTINUED | OUTPATIENT
Start: 2024-01-23 | End: 2024-01-23 | Stop reason: HOSPADM

## 2024-01-23 RX ORDER — DEXAMETHASONE SODIUM PHOSPHATE 4 MG/ML
INJECTION, SOLUTION INTRA-ARTICULAR; INTRALESIONAL; INTRAMUSCULAR; INTRAVENOUS; SOFT TISSUE
Status: DISCONTINUED | OUTPATIENT
Start: 2024-01-23 | End: 2024-01-23

## 2024-01-23 RX ORDER — HYDROCODONE BITARTRATE AND ACETAMINOPHEN 5; 325 MG/1; MG/1
1 TABLET ORAL EVERY 4 HOURS PRN
Status: DISCONTINUED | OUTPATIENT
Start: 2024-01-23 | End: 2024-01-23 | Stop reason: HOSPADM

## 2024-01-23 RX ORDER — ROCURONIUM BROMIDE 10 MG/ML
INJECTION, SOLUTION INTRAVENOUS
Status: DISCONTINUED | OUTPATIENT
Start: 2024-01-23 | End: 2024-01-23

## 2024-01-23 RX ORDER — HYDROCODONE BITARTRATE AND ACETAMINOPHEN 10; 325 MG/1; MG/1
1 TABLET ORAL EVERY 4 HOURS PRN
Status: DISCONTINUED | OUTPATIENT
Start: 2024-01-23 | End: 2024-01-23 | Stop reason: HOSPADM

## 2024-01-23 RX ORDER — ONDANSETRON HYDROCHLORIDE 2 MG/ML
INJECTION, SOLUTION INTRAVENOUS
Status: DISCONTINUED | OUTPATIENT
Start: 2024-01-23 | End: 2024-01-23

## 2024-01-23 RX ORDER — MIDAZOLAM HYDROCHLORIDE 1 MG/ML
INJECTION INTRAMUSCULAR; INTRAVENOUS
Status: DISCONTINUED | OUTPATIENT
Start: 2024-01-23 | End: 2024-01-23

## 2024-01-23 RX ORDER — SODIUM CHLORIDE 9 MG/ML
INJECTION, SOLUTION INTRAVENOUS CONTINUOUS
Status: DISPENSED | OUTPATIENT
Start: 2024-01-23

## 2024-01-23 RX ORDER — FENTANYL CITRATE 50 UG/ML
INJECTION, SOLUTION INTRAMUSCULAR; INTRAVENOUS
Status: DISCONTINUED | OUTPATIENT
Start: 2024-01-23 | End: 2024-01-23

## 2024-01-23 RX ORDER — ONDANSETRON 4 MG/1
8 TABLET, ORALLY DISINTEGRATING ORAL EVERY 8 HOURS PRN
Status: DISCONTINUED | OUTPATIENT
Start: 2024-01-23 | End: 2024-01-23 | Stop reason: HOSPADM

## 2024-01-23 RX ORDER — ONDANSETRON HYDROCHLORIDE 2 MG/ML
4 INJECTION, SOLUTION INTRAVENOUS DAILY PRN
Status: DISCONTINUED | OUTPATIENT
Start: 2024-01-23 | End: 2024-01-23 | Stop reason: HOSPADM

## 2024-01-23 RX ORDER — LIDOCAINE HYDROCHLORIDE 20 MG/ML
INJECTION, SOLUTION EPIDURAL; INFILTRATION; INTRACAUDAL; PERINEURAL
Status: DISCONTINUED | OUTPATIENT
Start: 2024-01-23 | End: 2024-01-23

## 2024-01-23 RX ORDER — PROPOFOL 10 MG/ML
VIAL (ML) INTRAVENOUS
Status: DISCONTINUED | OUTPATIENT
Start: 2024-01-23 | End: 2024-01-23

## 2024-01-23 RX ORDER — DIPHENHYDRAMINE HYDROCHLORIDE 50 MG/ML
25 INJECTION INTRAMUSCULAR; INTRAVENOUS EVERY 6 HOURS PRN
Status: DISCONTINUED | OUTPATIENT
Start: 2024-01-23 | End: 2024-01-23 | Stop reason: HOSPADM

## 2024-01-23 RX ADMIN — CEFAZOLIN 3 G: 2 INJECTION, POWDER, FOR SOLUTION INTRAMUSCULAR; INTRAVENOUS at 04:01

## 2024-01-23 RX ADMIN — PROPOFOL 200 MG: 10 INJECTION, EMULSION INTRAVENOUS at 04:01

## 2024-01-23 RX ADMIN — ROCURONIUM BROMIDE 50 MG: 10 INJECTION, SOLUTION INTRAVENOUS at 04:01

## 2024-01-23 RX ADMIN — DEXAMETHASONE SODIUM PHOSPHATE 4 MG: 4 INJECTION, SOLUTION INTRA-ARTICULAR; INTRALESIONAL; INTRAMUSCULAR; INTRAVENOUS; SOFT TISSUE at 04:01

## 2024-01-23 RX ADMIN — MIDAZOLAM 2 MG: 1 INJECTION INTRAMUSCULAR; INTRAVENOUS at 04:01

## 2024-01-23 RX ADMIN — LIDOCAINE HYDROCHLORIDE 60 MG: 20 INJECTION, SOLUTION INTRAVENOUS at 04:01

## 2024-01-23 RX ADMIN — SODIUM CHLORIDE: 9 INJECTION, SOLUTION INTRAVENOUS at 01:01

## 2024-01-23 RX ADMIN — HYDROMORPHONE HYDROCHLORIDE 0.5 MG: 2 INJECTION, SOLUTION INTRAMUSCULAR; INTRAVENOUS; SUBCUTANEOUS at 05:01

## 2024-01-23 RX ADMIN — HYDROMORPHONE HYDROCHLORIDE 0.5 MG: 2 INJECTION, SOLUTION INTRAMUSCULAR; INTRAVENOUS; SUBCUTANEOUS at 04:01

## 2024-01-23 RX ADMIN — ONDANSETRON 8 MG: 2 INJECTION INTRAMUSCULAR; INTRAVENOUS at 04:01

## 2024-01-23 RX ADMIN — FENTANYL CITRATE 100 MCG: 50 INJECTION INTRAMUSCULAR; INTRAVENOUS at 04:01

## 2024-01-23 RX ADMIN — SUGAMMADEX 200 MG: 100 INJECTION, SOLUTION INTRAVENOUS at 05:01

## 2024-01-23 NOTE — H&P (VIEW-ONLY)
Department of Orthopedic Surgery    History and Physical       Principal Problem: Closed fracture of right ankle, initial encounter [Y76.197O]           HISTORY:  48-year-old male with a displaced fracture of the right distal fibula with widening of the mortise needing open reduction internal fixation of the right ankle fracture    PAST MEDICAL HISTORY: History reviewed. No pertinent past medical history.     PAST SURGICAL HISTORY:   Past Surgical History:   Procedure Laterality Date    EXCISION OF PAROTID GLAND Right 3/29/2022    Procedure: EXCISION, PAROTID GLAND;  Surgeon: Hari Mattson MD;  Location: [facility], [place];  Service: ENT;  Laterality: Right;          ALLERGIES:   Review of patient's allergies indicates:   Allergen Reactions    Shrimp           MEDICATIONS: (Not in a hospital admission)       SOCIAL HISTORY:   Social History     Socioeconomic History    Marital status: Single   Tobacco Use    Smoking status: Never    Smokeless tobacco: Never   Substance and Sexual Activity    Alcohol use: Not Currently          FAMILY HISTORY: History reviewed. No pertinent family history.       PHYSICAL EXAM:   There were no vitals filed for this visit.  There is no height or weight on file to calculate BMI.     In general, this is a well-developed, well-nourished male . The patient is alert, oriented and cooperative.      HEENT:  Normocephalic, atraumatic.  Extraocular movements are intact bilaterally.  The oropharynx is benign.       NECK:  Nontender with good range of motion.      LUNGS:  Clear to auscultation bilaterally.      HEART:  Demonstrates a regular rate and rhythm.  No murmurs appreciated.      ABDOMEN:  Soft, non-tender, non-distended.        EXTREMITIES:  Right lower extremity patient moves his toes has sensation to touch has palpable pulses he has swelling ecchymosis about the lateral malleolus tender to palpation both medially and laterally no crepitus noted      RADIOGRAPHIC FINDINGS:  X-rays show  displaced fracture of the right distal fibula with widening of the mortise      IMPRESSION:  Displaced distal fibular fracture right ankle      PLAN:  Open reduction internal fixation right ankle fracture    I had a long discussion with the patient about treatment options, including operative and nonoperative treatments. We discussed pros and cons of each including risks pertinent to surgery including pain, infection, bleeding, damage to adjacent structures like nerves and blood vessels, failure to heal, need for future surgeries, stiffness, instability, loss of limb, anesthesia risks like stroke, blood clot, loss of life. We discussed the possibility of need for later hardware removal in the case that hardware was used. We discussed common and uncommon risks, and discussed patient specific factors that may increase the risks present with surgery. All questions were answered. The patient expressed understanding of the pros and cons of surgery and wanted to proceed with surgical treatment.        (Subject to voice recognition error, transcription service not allowed)

## 2024-01-23 NOTE — PROGRESS NOTES
Department of Orthopedic Surgery    History and Physical       Principal Problem: Closed fracture of right ankle, initial encounter [I15.617V]           HISTORY:  48-year-old male with a displaced fracture of the right distal fibula with widening of the mortise needing open reduction internal fixation of the right ankle fracture    PAST MEDICAL HISTORY: History reviewed. No pertinent past medical history.     PAST SURGICAL HISTORY:   Past Surgical History:   Procedure Laterality Date    EXCISION OF PAROTID GLAND Right 3/29/2022    Procedure: EXCISION, PAROTID GLAND;  Surgeon: Hari Mattson MD;  Location: [facility];  Service: ENT;  Laterality: Right;          ALLERGIES:   Review of patient's allergies indicates:   Allergen Reactions    Shrimp           MEDICATIONS: (Not in a hospital admission)       SOCIAL HISTORY:   Social History     Socioeconomic History    Marital status: Single   Tobacco Use    Smoking status: Never    Smokeless tobacco: Never   Substance and Sexual Activity    Alcohol use: Not Currently          FAMILY HISTORY: History reviewed. No pertinent family history.       PHYSICAL EXAM:   There were no vitals filed for this visit.  There is no height or weight on file to calculate BMI.     In general, this is a well-developed, well-nourished male . The patient is alert, oriented and cooperative.      HEENT:  Normocephalic, atraumatic.  Extraocular movements are intact bilaterally.  The oropharynx is benign.       NECK:  Nontender with good range of motion.      LUNGS:  Clear to auscultation bilaterally.      HEART:  Demonstrates a regular rate and rhythm.  No murmurs appreciated.      ABDOMEN:  Soft, non-tender, non-distended.        EXTREMITIES:  Right lower extremity patient moves his toes has sensation to touch has palpable pulses he has swelling ecchymosis about the lateral malleolus tender to palpation both medially and laterally no crepitus noted      RADIOGRAPHIC FINDINGS:  X-rays show  displaced fracture of the right distal fibula with widening of the mortise      IMPRESSION:  Displaced distal fibular fracture right ankle      PLAN:  Open reduction internal fixation right ankle fracture    I had a long discussion with the patient about treatment options, including operative and nonoperative treatments. We discussed pros and cons of each including risks pertinent to surgery including pain, infection, bleeding, damage to adjacent structures like nerves and blood vessels, failure to heal, need for future surgeries, stiffness, instability, loss of limb, anesthesia risks like stroke, blood clot, loss of life. We discussed the possibility of need for later hardware removal in the case that hardware was used. We discussed common and uncommon risks, and discussed patient specific factors that may increase the risks present with surgery. All questions were answered. The patient expressed understanding of the pros and cons of surgery and wanted to proceed with surgical treatment.        (Subject to voice recognition error, transcription service not allowed)

## 2024-01-23 NOTE — BRIEF OP NOTE
"[facility] - Orthopedic Periop Services  Brief Operative Note    Surgery Date: 1/23/2024     Surgeon(s) and Role:     * Ian Dorman MD - Primary    Assisting Surgeon: None    Pre-op Diagnosis:  Closed fracture of distal end of right fibula, unspecified fracture morphology, initial encounter [S82.831A]    Post-op Diagnosis:  Post-Op Diagnosis Codes:     * Closed fracture of distal end of right fibula, unspecified fracture morphology, initial encounter [S82.831A]    Procedure(s) (LRB):  ORIF, ANKLE (Right)    Anesthesia: General    Operative Findings:  Patient underwent ORIF of the right ankle without complication    Estimated Blood Loss: * No values recorded between 1/23/2024  4:54 PM and 1/23/2024  5:32 PM *         Specimens:   Specimen (24h ago, onward)      None              Discharge Note    OUTCOME: Patient tolerated treatment/procedure well without complication and is now ready for discharge.    DISPOSITION: Home or Self Care    FINAL DIAGNOSIS:  Closed fracture of right ankle    FOLLOWUP: In clinic    DISCHARGE INSTRUCTIONS:    Discharge Procedure Orders   CRUTCHES FOR HOME USE     Order Specific Question Answer Comments   Type: Axillary    Height: 5'11"    Weight: 140    Length of need (1-99 months): 2      Diet general     Keep surgical extremity elevated     Ice to affected area   Order Comments: using barrier between ice and skin (specify duration&frequency)     Call MD for:  temperature >100.4     Call MD for:  persistent nausea and vomiting     Call MD for:  severe uncontrolled pain     Call MD for:  difficulty breathing, headache or visual disturbances     Call MD for:  redness, tenderness, or signs of infection (pain, swelling, redness, odor or green/yellow discharge around incision site)     Call MD for:  hives     Call MD for:  persistent dizziness or light-headedness     Call MD for:  extreme fatigue     Leave dressing on - Keep it clean, dry, and intact until clinic visit     Activity " as tolerated     Shower on day dressing removed (No bath)     Weight bearing restrictions (specify)   Order Comments: Toe-touch weight-bearing right lower extremity with the crutches

## 2024-01-23 NOTE — ANESTHESIA PREPROCEDURE EVALUATION
01/23/2024  Pablo Tabares is a 48 y.o., male.      Pre-op Assessment    I have reviewed the Patient Summary Reports.     I have reviewed the Nursing Notes. I have reviewed the NPO Status.   I have reviewed the Medications.     Review of Systems  Anesthesia Hx:             Denies Family Hx of Anesthesia complications.    Denies Personal Hx of Anesthesia complications.                    Social:  Non-Smoker, No Alcohol Use       Hematology/Oncology:  Hematology Normal   Oncology Normal                                   EENT/Dental:  EENT/Dental Normal           Cardiovascular:  Cardiovascular Normal                                            Pulmonary:  Pulmonary Normal                       Renal/:  Renal/ Normal                 Hepatic/GI:  Hepatic/GI Normal                 Musculoskeletal:  Musculoskeletal Normal                Neurological:  Neurology Normal                                      Endocrine:  Endocrine Normal          Morbid Obesity / BMI > 40  Dermatological:  Skin Normal    Psych:  Psychiatric Normal                       Anesthesia Plan  Type of Anesthesia, risks & benefits discussed:    Anesthesia Type: Gen ETT  Intra-op Monitoring Plan: Standard ASA Monitors  Post Op Pain Control Plan: multimodal analgesia  Induction:  IV  Airway Plan: Direct  Informed Consent: Informed consent signed with the Patient and all parties understand the risks and agree with anesthesia plan.  All questions answered.   ASA Score: 2  Day of Surgery Review of History & Physical: H&P Update referred to the surgeon/provider.I have interviewed and examined the patient. I have reviewed the patient's H&P dated: There are no significant changes.     Ready For Surgery From Anesthesia Perspective.     .

## 2024-01-23 NOTE — OP NOTE
[facility] - Orthopedic Periop Services  General Surgery  Operative Note    SUMMARY     Date of Procedure: 1/23/2024     Procedure: Procedure(s) (LRB):  ORIF, ANKLE (Right)       Surgeon(s) and Role:     * Ian Dorman MD - Primary    Assisting Surgeon: None    Pre-Operative Diagnosis: Closed fracture of distal end of right fibula, unspecified fracture morphology, initial encounter [S82.831A]    Post-Operative Diagnosis: Post-Op Diagnosis Codes:     * Closed fracture of distal end of right fibula, unspecified fracture morphology, initial encounter [S82.831A]    Anesthesia: General    Operative Findings (including complications, if any):  After having risks and benefits of procedure explained at length the patient stating understands risks benefits written informed consent was obtained.  Patient was taken operating room placed in supine position on operative table which time anesthesia was induced per Anesthesia.  This time his right lower extremity had a tourniquet placed over cast padding right leg was then prepped and draped sterile fashion.  Leg was elevated exsanguinated Esmarch bandage tourniquet inflated 250 mm Hg at this time a 10 cm incision was made over lateral fibula starting of the tip of the fibula going proximally.  Skin incision made 15. Blade.  Careful dissection down to the bone was performed using pickups and scissors.  Hemostasis maintained Bovie electrocautery.  Neurovascular structures protected.  This time the fracture site was identified and debrided of any granulation tissue and hematoma.  Fracture was then reduced there was noted be comminution of the fracture site once this was reduced a 4 hole distal fibular plate from [place] was placed and secured with locking nonlocking screws.  Titanium plate was used.  At this time wound irrigated out copious amounts normal saline the for all screw lengths were confirmed under fluoroscopic vision be correct length.  Once wound was irrigated  out tourniquet was let down hemostasis maintained Bovie electrocautery.  Subcuticular 2-0 Vicryl followed by skin staples deep tissues closed with 2-0 Vicryl.  Sterile occlusive dressing was placed posterior splint placed with stirrups patient was then awakened taken recovery room in good condition.  All counts correct.  No complications.    Description of Technical Procedures:     Significant Surgical Tasks Conducted by the Assistant(s), if Applicable:     Estimated Blood Loss (EBL): * No values recorded between 1/23/2024  4:54 PM and 1/23/2024  5:30 PM *10cc           Implants: * No implants in log *    Specimens:   Specimen (24h ago, onward)      None                    Condition: Good    Disposition: PACU - hemodynamically stable.    Attestation: I was present and scrubbed for the entire procedure.

## 2024-01-23 NOTE — TRANSFER OF CARE
"Anesthesia Transfer of Care Note    Patient: Pablo Tabares    Procedure(s) Performed: Procedure(s) (LRB):  ORIF, ANKLE (Right)    Patient location: PACU    Anesthesia Type: general    Transport from OR: Transported from OR on 2-3 L/min O2 by NC with adequate spontaneous ventilation    Post pain: adequate analgesia    Post assessment: no apparent anesthetic complications and tolerated procedure well    Post vital signs: stable    Level of consciousness: responds to stimulation and oriented    Nausea/Vomiting: no nausea/vomiting    Complications: none    Transfer of care protocol was followed      Last vitals: Visit Vitals  BP (!) 154/94 (BP Location: Right arm, Patient Position: Lying)   Pulse 91   Temp 36.9 °C (98.5 °F) (Oral)   Resp 15   Ht 5' 11" (1.803 m)   Wt (!) 140.6 kg (310 lb)   SpO2 96%   BMI 43.24 kg/m²     "

## 2024-01-23 NOTE — INTERVAL H&P NOTE
The patient has been examined and the H&P has been reviewed:    I concur with the findings and no changes have occurred since H&P was written.    Surgery risks, benefits and alternative options discussed and understood by patient/family.          Active Hospital Problems    Diagnosis  POA    *Closed fracture of right ankle [S81.015I]  Yes      Resolved Hospital Problems   No resolved problems to display.

## 2024-01-24 NOTE — ANESTHESIA POSTPROCEDURE EVALUATION
Anesthesia Post Evaluation    Patient: Pablo Tabares    Procedure(s) Performed: Procedure(s) (LRB):  ORIF, ANKLE (Right)    Final Anesthesia Type: general      Patient location during evaluation: PACU  Patient participation: Yes- Able to Participate  Level of consciousness: awake and alert and oriented  Post-procedure vital signs: reviewed and stable  Pain management: adequate  Airway patency: patent  CHAD mitigation strategies: Multimodal analgesia  PONV status at discharge: No PONV  Anesthetic complications: no      Cardiovascular status: hemodynamically stable  Respiratory status: unassisted, spontaneous ventilation and nasal cannula  Hydration status: euvolemic  Follow-up not needed.              Vitals Value Taken Time   /82 01/23/24 1810   Temp 36.9 °C (98.5 °F) 01/23/24 1759   Pulse 82 01/23/24 1812   Resp 15 01/23/24 1810   SpO2 94 % 01/23/24 1812   Vitals shown include unvalidated device data.      No case tracking events are documented in the log.      Pain/Jose Luis Score: Jose Luis Score: 9 (1/23/2024  5:52 PM)           Stelara Pregnancy And Lactation Text: This medication is Pregnancy Category B and is considered safe during pregnancy. It is unknown if this medication is excreted in breast milk.

## 2024-01-24 NOTE — PT/OT/SLP EVAL
Physical Therapy Evaluation    Patient Name:  Pablo Tabares   MRN:  [id number]    Recommendations:     Discharge Recommendations: Low Intensity Therapy   Discharge Equipment Recommendations: none   Barriers to discharge: none    Assessment:     Pablo Tabares is a 48 y.o. male admitted with a medical diagnosis of Closed fracture of right ankle.  He presents with the following impairments/functional limitations:   Patient with good use of crutches nwb .    Rehab Prognosis: Good; patient would benefit from acute skilled PT services to address these deficits and reach maximum level of function.    Recent Surgery: Procedure(s) (LRB):  ORIF, ANKLE (Right) Day of Surgery    Plan:     During this hospitalization, patient to be seen 1 x/week to address the identified rehab impairments via therapeutic activities and progress toward the following goals:    Plan of Care Expires:       Subjective     Chief Complaint: post op pain  Patient/Family Comments/goals: plan is dc home today  Pain/Comfort:  Pain Rating 1: 4/10  Location - Side 1: Right  Location 1: ankle  Pain Addressed 1: Cessation of Activity, Pre-medicate for activity    Patients cultural, spiritual, Yarsani conflicts given the current situation:      Living Environment:  Lives with family, 4 steps  Prior to admission, patients level of function was independent.  Equipment used at home: crutches.  DME owned (not currently used): none.  Upon discharge, patient will have assistance from family.    Objective:     Communicated with nurse prior to session.  Patient found supine with    upon PT entry to room.    General Precautions: Standard,    Orthopedic Precautions:RLE non weight bearing   Braces: N/A  Respiratory Status: Room air    Exams:  na    Functional Mobility:  Gait: ambulated 20 feet with crutches nwb right le      AM-PAC 6 CLICK MOBILITY  Total Score:18       Treatment & Education:  Up down steps x 2 nwb right le  Nwb x 6-8 weeks    Patient left supine with all  lines intact.    GOALS:   Multidisciplinary Problems       Physical Therapy Goals       Not on file                    History:     History reviewed. No pertinent past medical history.    Past Surgical History:   Procedure Laterality Date    EXCISION OF PAROTID GLAND Right 3/29/2022    Procedure: EXCISION, PAROTID GLAND;  Surgeon: Hari Mattson MD;  Location: [facility];  Service: ENT;  Laterality: Right;       Time Tracking:     PT Received On: 01/23/24  PT Start Time: 1920     PT Stop Time: 1940  PT Total Time (min): 20 min     Billable Minutes: Evaluation 20 01/23/2024

## 2024-01-24 NOTE — PROGRESS NOTES
1752 RECEIVED TO RR EASILY AROUSED. HOB ELEVATED. ENCOURAGED COUGH AND DEEP BREATHS. O2 VIA NC. ALERT, ORIENTED, FOLLOWS COMMANDS, AMITA. NO C/O PAIN. DRESSING RIGHT LOWER EXTREMITY D/I, TOES EXPOSED, VERBALIZES GOOD SENSATION. IV INFUSING WELL LEFT HAND 22G CATH. OBSERVING CLOSELY. SEE FLOW SHEET.    1805 X-RAYS DONE PER SHAUNA ARRIAGA. WELL. NO C/O PAIN. ENCOURAGED DEEP BREATHS AND COUGH.    1830 AWAKE, ALERT. NO C/O PAIN. DRESSING D/I. TRANSFERRED TO ROOM WITHOUT DISTRESS NOTED.

## 2024-01-31 DIAGNOSIS — Z47.89 ORTHOPEDIC AFTERCARE: Primary | ICD-10-CM

## 2024-02-05 ENCOUNTER — HOSPITAL ENCOUNTER (OUTPATIENT)
Dept: RADIOLOGY | Facility: HOSPITAL | Age: 49
Discharge: HOME | End: 2024-02-05
Payer: COMMERCIAL

## 2024-02-05 ENCOUNTER — OFFICE VISIT (OUTPATIENT)
Dept: ORTHOPEDICS | Facility: CLINIC | Age: 49
End: 2024-02-05
Payer: COMMERCIAL

## 2024-02-05 DIAGNOSIS — Z47.89 ORTHOPEDIC AFTERCARE: ICD-10-CM

## 2024-02-05 DIAGNOSIS — Z09 POSTOP CHECK: Primary | ICD-10-CM

## 2024-02-05 PROCEDURE — 99213 OFFICE O/P EST LOW 20 MIN: CPT | Mod: PBBFAC,25

## 2024-02-05 PROCEDURE — 73610 X-RAY EXAM OF ANKLE: CPT | Mod: 26,RT,, | Performed by: STUDENT IN AN ORGANIZED HEALTH CARE EDUCATION/TRAINING PROGRAM

## 2024-02-05 PROCEDURE — 29405 APPL SHORT LEG CAST: CPT | Mod: S$PBB,58,RT,

## 2024-02-05 PROCEDURE — 29405 APPL SHORT LEG CAST: CPT | Mod: PBBFAC

## 2024-02-05 PROCEDURE — 99024 POSTOP FOLLOW-UP VISIT: CPT | Mod: ,,,

## 2024-02-05 PROCEDURE — 99999PBSHW PR PBB SHADOW TECHNICAL ONLY FILED TO HB: Mod: PBBFAC,,,

## 2024-02-05 PROCEDURE — 73610 X-RAY EXAM OF ANKLE: CPT | Mod: TC,RT

## 2024-02-05 RX ORDER — HYDROCODONE BITARTRATE AND ACETAMINOPHEN 10; 325 MG/1; MG/1
1 TABLET ORAL EVERY 6 HOURS PRN
Qty: 28 TABLET | Refills: 0 | Status: SHIPPED | OUTPATIENT
Start: 2024-02-05

## 2024-02-05 NOTE — PATIENT INSTRUCTIONS
Patient is status-post Orif, Ankle - Right doing well.  Personally reviewed x-rays today with hardware in place.  Surgical splint removed today and placed in short-leg fiberglass cast.  Patient staples removed today, wound cleaned and placed Steri-Strips on incision.  We will give 1 refill of pain medications today.  Follow up with Dr. Dorman in 4 weeks with repeat x-rays, sooner if needed.

## 2024-02-05 NOTE — PROGRESS NOTES
Orif, Ankle - Right 1/23/2024    Pablo Tabares is a 48 y.o. male seen today for post-op visit.  Patient is 2 weeks status post ORIF right ankle for distal fibula fracture doing well.  He has in a wheelchair today.  Surgical splint in place.  No complaints today.      PAST MEDICAL HISTORY: History reviewed. No pertinent past medical history.     PAST SURGICAL HISTORY:   Past Surgical History:   Procedure Laterality Date    EXCISION OF PAROTID GLAND Right 3/29/2022    Procedure: EXCISION, PAROTID GLAND;  Surgeon: Hari Mattson MD;  Location: [facility] OR;  Service: ENT;  Laterality: Right;    OPEN REDUCTION AND INTERNAL FIXATION (ORIF) OF INJURY OF ANKLE Right 1/23/2024    Procedure: ORIF, ANKLE;  Surgeon: Ian Dorman MD;  Location: [facility] OR;  Service: Orthopedics;  Laterality: Right;        MEDICATIONS:   Current Outpatient Medications:     HYDROcodone-acetaminophen (NORCO)  mg per tablet, Take 1 tablet by mouth every 6 (six) hours as needed for Pain., Disp: 28 tablet, Rfl: 0    HYDROcodone-acetaminophen (NORCO) 7.5-325 mg per tablet, Take 1 tablet by mouth every 6 (six) hours as needed for Pain (severe right ankle pain)., Disp: 14 tablet, Rfl: 0    ibuprofen (ADVIL,MOTRIN) 800 MG tablet, Take 1 tablet (800 mg total) by mouth every 8 (eight) hours as needed for Pain., Disp: 60 tablet, Rfl: 0  No current facility-administered medications for this visit.    Facility-Administered Medications Ordered in Other Visits:     0.9%  NaCl infusion, , Intravenous, Continuous, Ian Dorman MD, Last Rate: 75 mL/hr at 01/23/24 1305, Restarted at 01/23/24 1616       PHYSICAL EXAM:      GENERAL: Well-developed, well-nourished male . The patient is alert, oriented and cooperative.    EXTREMITIES:  Right ankle with skin clean dry and intact, incision with minimal swelling and erythema    WOUND: Incisions are clean,dry,intact without signs of infection and healing well      RADIOGRAPHIC  FINDINGS:   X-Ray Ankle Complete Right    Result Date: 2/5/2024  EXAMINATION: XR ANKLE COMPLETE 3 VIEW RIGHT CLINICAL HISTORY: Encounter for other orthopedic aftercare TECHNIQUE: XR ANKLE COMPLETE 3 VIEW RIGHT COMPARISON: 1/22/24 FINDINGS: Anatomic alignment of the right ankle.  Interval placement of a metallic plate and cortical screws affixed to the lateral distal fibula which are in expected position.     Anatomic alignment of the right ankle.  Interval placement of a metallic plate and cortical screws affixed to the lateral distal fibula, which are in expected position. Electronically signed by: Sukh Costa Date:    02/05/2024 Time:    08:38    X-Ray Ankle Complete Right    Result Date: 1/23/2024  EXAMINATION: XR ANKLE COMPLETE 3 VIEW RIGHT CLINICAL HISTORY: ORIF ankle; TECHNIQUE: AP, lateral, and oblique images of the right ankle were performed. COMPARISON: Preoperative exam 01/22/2024 FINDINGS: Interval fixation of the lateral malleolar fracture.  The hardware projects in expected location.  A splint is now present.  No acute findings.     Postoperative changes Electronically signed by: Live Greenberg Date:    01/23/2024 Time:    18:42    FL Limited Non-Billable    Result Date: 1/23/2024  See Physician's Notes for results. IMPRESSION: See Physician's Notes for results This procedure was auto-finalized  See Physicians Notes for your results.    X-Ray Ankle Complete 3 View Right    Result Date: 1/22/2024  See Procedure Notes for results. IMPRESSION: Please see Ortho procedure notes for report.  This procedure was auto-finalized by: Virtual Radiologist    X-Ray Ankle Complete 3 View Right    Result Date: 1/18/2024  EXAMINATION: XR ANKLE COMPLETE 3 VIEW RIGHT CLINICAL HISTORY: Pain in right ankle and joints of right foot TECHNIQUE: XR ANKLE COMPLETE 3 VIEW RIGHT COMPARISON: None FINDINGS: Acute nondisplaced fracture of the distal fibula. The lateral clear space measures 5 mm and the medial clear space measures 5  mm. No radiopaque foreign bodies.     Acute nondisplaced fracture of the distal fibula. Widening of the medial and lateral clear spaces, correlate with ligamentous injury. Electronically signed by: Sukh Costa Date:    01/18/2024 Time:    15:22       Patient Active Problem List    Diagnosis Date Noted    Closed fracture of right ankle 01/22/2024    Parotid mass      IMPRESSION AND PLAN: Patient is status-post Orif, Ankle - Right doing well.  Personally reviewed x-rays today with hardware in place.  Surgical splint removed today and placed in short-leg fiberglass cast.  Patient staples removed today, wound cleaned and placed Steri-Strips on incision.  We will give 1 refill of pain medications today.  Follow up with Dr. Dorman in 4 weeks with repeat x-rays, sooner if needed.      No follow-ups on file.       Rachel Valdovinos PA-C      (Subject to voice recognition error, transcription service not allowed)

## 2024-02-27 LAB
OHS QRS DURATION: 90 MS
OHS QTC CALCULATION: 442 MS

## 2024-03-05 DIAGNOSIS — S82.831A CLOSED FRACTURE OF DISTAL END OF RIGHT FIBULA, UNSPECIFIED FRACTURE MORPHOLOGY, INITIAL ENCOUNTER: Primary | ICD-10-CM

## 2024-03-06 ENCOUNTER — OFFICE VISIT (OUTPATIENT)
Dept: ORTHOPEDICS | Facility: CLINIC | Age: 49
End: 2024-03-06
Payer: COMMERCIAL

## 2024-03-06 ENCOUNTER — HOSPITAL ENCOUNTER (OUTPATIENT)
Dept: RADIOLOGY | Facility: HOSPITAL | Age: 49
Discharge: HOME | End: 2024-03-06
Attending: ORTHOPAEDIC SURGERY
Payer: COMMERCIAL

## 2024-03-06 DIAGNOSIS — S82.831A CLOSED FRACTURE OF DISTAL END OF RIGHT FIBULA, UNSPECIFIED FRACTURE MORPHOLOGY, INITIAL ENCOUNTER: ICD-10-CM

## 2024-03-06 DIAGNOSIS — Z47.89 ORTHOPEDIC AFTERCARE: Primary | ICD-10-CM

## 2024-03-06 PROCEDURE — 99024 POSTOP FOLLOW-UP VISIT: CPT | Mod: ,,, | Performed by: ORTHOPAEDIC SURGERY

## 2024-03-06 PROCEDURE — 99212 OFFICE O/P EST SF 10 MIN: CPT | Mod: PBBFAC,25 | Performed by: ORTHOPAEDIC SURGERY

## 2024-03-06 PROCEDURE — 73610 X-RAY EXAM OF ANKLE: CPT | Mod: 26,RT,, | Performed by: ORTHOPAEDIC SURGERY

## 2024-03-06 PROCEDURE — 73610 X-RAY EXAM OF ANKLE: CPT | Mod: TC,RT

## 2024-03-06 NOTE — PROGRESS NOTES
Radiology Interpretation        Patient Name: Pablo Tabares  Date: 3/6/2024  YOB: 1975  MRN# [id number]        ORDERING DIAGNOSIS:    Encounter Diagnosis   Name Primary?    Orthopedic aftercare Yes        Three views AP lateral mortise views right ankle skeletally mature individual there is a fracture of the distal fibula no widening of the mortise no shift alignment hardware is in place there no bony lesions impression healing fracture right distal fibula               Ian Dorman MD

## 2024-03-06 NOTE — PROGRESS NOTES
Patient is here for follow-up of his right ankle ORIF.  He is now 6 weeks out.  He has no widening of the mortise.  His hardware is in place.  There is callus forming.  Took him out of his cast.  Placed him in a cam walker.  Let him weightbear as tolerates.  I will follow back up in 4 weeks.

## 2024-04-02 DIAGNOSIS — S82.831A CLOSED FRACTURE OF DISTAL END OF RIGHT FIBULA, UNSPECIFIED FRACTURE MORPHOLOGY, INITIAL ENCOUNTER: Primary | ICD-10-CM

## 2024-04-03 ENCOUNTER — HOSPITAL ENCOUNTER (OUTPATIENT)
Dept: RADIOLOGY | Facility: HOSPITAL | Age: 49
Discharge: HOME | End: 2024-04-03
Attending: ORTHOPAEDIC SURGERY
Payer: COMMERCIAL

## 2024-04-03 ENCOUNTER — OFFICE VISIT (OUTPATIENT)
Dept: ORTHOPEDICS | Facility: CLINIC | Age: 49
End: 2024-04-03
Payer: COMMERCIAL

## 2024-04-03 VITALS — BODY MASS INDEX: 43.4 KG/M2 | HEIGHT: 71 IN | WEIGHT: 310 LBS

## 2024-04-03 DIAGNOSIS — S82.831D CLOSED FRACTURE OF DISTAL END OF RIGHT FIBULA WITH ROUTINE HEALING, UNSPECIFIED FRACTURE MORPHOLOGY, SUBSEQUENT ENCOUNTER: Primary | ICD-10-CM

## 2024-04-03 DIAGNOSIS — S82.831A CLOSED FRACTURE OF DISTAL END OF RIGHT FIBULA, UNSPECIFIED FRACTURE MORPHOLOGY, INITIAL ENCOUNTER: ICD-10-CM

## 2024-04-03 PROCEDURE — 73610 X-RAY EXAM OF ANKLE: CPT | Mod: TC,RT

## 2024-04-03 PROCEDURE — 1159F MED LIST DOCD IN RCRD: CPT | Mod: CPTII,,, | Performed by: ORTHOPAEDIC SURGERY

## 2024-04-03 PROCEDURE — 73610 X-RAY EXAM OF ANKLE: CPT | Mod: 26,RT,, | Performed by: ORTHOPAEDIC SURGERY

## 2024-04-03 PROCEDURE — 99024 POSTOP FOLLOW-UP VISIT: CPT | Mod: ,,, | Performed by: ORTHOPAEDIC SURGERY

## 2024-04-03 PROCEDURE — 99213 OFFICE O/P EST LOW 20 MIN: CPT | Mod: PBBFAC,25 | Performed by: ORTHOPAEDIC SURGERY

## 2024-04-03 NOTE — PROGRESS NOTES
Radiology Interpretation        Patient Name: Pablo Tabares  Date: 4/3/2024  YOB: 1975  MRN# [id number]        ORDERING DIAGNOSIS:    Encounter Diagnosis   Name Primary?    Closed fracture of distal end of right fibula with routine healing, unspecified fracture morphology, subsequent encounter Yes           Three views AP lateral mortise views right ankle skeletally mature individual there is a healing fracture of the distal fibula plate and screws in place.  No widening of the mortise no bony lesions impression healing fracture right ankle            Ian Dorman MD

## 2024-04-03 NOTE — PROGRESS NOTES
Patient is here for follow-up of his right ankle ORIF.  Neurovascularly he is intact distally.  He has no widening of the mortise.  His wounds are clean dry.  He is weight-bearing as tolerates.  He has been wearing his boot.  He is still having little swelling.  I will send him to therapy for range motion strengthening.  I will follow back up in 4-6 weeks.

## 2024-04-22 ENCOUNTER — CLINICAL SUPPORT (OUTPATIENT)
Dept: REHABILITATION | Facility: HOSPITAL | Age: 49
End: 2024-04-22
Payer: COMMERCIAL

## 2024-04-22 DIAGNOSIS — M25.471 SWELLING OF JOINT, ANKLE, RIGHT: ICD-10-CM

## 2024-04-22 DIAGNOSIS — Z87.81 STATUS POST ORIF OF FRACTURE OF ANKLE: Primary | ICD-10-CM

## 2024-04-22 DIAGNOSIS — M25.671 STIFFNESS OF RIGHT ANKLE JOINT: ICD-10-CM

## 2024-04-22 DIAGNOSIS — R26.9 GAIT DISTURBANCE: ICD-10-CM

## 2024-04-22 DIAGNOSIS — S82.831D CLOSED FRACTURE OF DISTAL END OF RIGHT FIBULA WITH ROUTINE HEALING, UNSPECIFIED FRACTURE MORPHOLOGY, SUBSEQUENT ENCOUNTER: ICD-10-CM

## 2024-04-22 DIAGNOSIS — Z98.890 STATUS POST ORIF OF FRACTURE OF ANKLE: Primary | ICD-10-CM

## 2024-04-22 PROCEDURE — 97161 PT EVAL LOW COMPLEX 20 MIN: CPT

## 2024-04-22 PROCEDURE — 97110 THERAPEUTIC EXERCISES: CPT

## 2024-04-22 NOTE — PLAN OF CARE
[facility]   Physical Therapy Initial Evaluation      Name: Pablo Tabares  Clinic Number: [id number]    Therapy Diagnosis:   Encounter Diagnosis   Name Primary?    Closed fracture of distal end of right fibula with routine healing, unspecified fracture morphology, subsequent encounter         Physician: Ian Dorman MD    Physician Orders: PT Eval and Treat   Medical Diagnosis from Referral: see  Evaluation Date: 4/22/2024  Authorization Period Expiration: 4/22/2024 - evaluation only approved  Plan of Care Expiration: 6/14/2024    Date of Surgery: 1/23/2024  Visit # / Visits authorized: 1/ 1 - evaluation only approved    FOTO: 1/ 3    Precautions: Standard     Time In: 10:56  Time Out: 11:40 am  Total Billable Time: 44 minutes    Subjective     Date of onset: 1/23/2024    History of current condition - Pablo reports: just stated it was a freak accident when asked how he injured the ankle    Falls: n/a    Imaging: xrays right ankle    Prior Therapy: none  Social History:  lives with their family  Occupation:   Prior Level of Function: independent   Current Level of Function: independent     Pain:  Current 0/10, worst 8/10, best 0/10   Location: right ankle  Description: Aching and Throbbing  Aggravating Factors: Standing, Walking, and Morning  Easing Factors: pain medication and rest    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    Surgical History:   Pablo Tabares  has a past surgical history that includes Excision of parotid gland (Right, 3/29/2022) and Open reduction and internal fixation (ORIF) of injury of ankle (Right, 1/23/2024).    Medications:   Pablo has a current medication list which includes the following prescription(s): hydrocodone-acetaminophen and ibuprofen, and the following Facility-Administered Medications: sodium chloride 0.9%.    Allergies:   Review of patient's allergies  indicates:   Allergen Reactions    Shrimp         Objective      Incisions: healed - still has some scabbing and keloid formation - educated to massage scar with vitamin E oil  Girth Measurements: Right 32 cm - malleoli  Left 30 cm - malleloli  Comments:      Range of motion:  Motion Right Left    Ankle DF   -2 degrees    +8 degrees    Ankle PF   60 degrees   WITHIN FUNCTIONAL LIMITS   Ankle Inversion   10 degrees - p!   35 degrees    Ankle Eversion   3 degrees - p!   18 degrees        Manual muscle test   Muscle Right  Left    Ankle DF  MMT strength: 4/5  MMT strength: 5/5   Ankle PF  MMT strength: 4/5  MMT strength: 5/5   Ankle inversion  MMT strength: 3+/5  MMT strength: 5/5   Ankle eversion  MMT strength: 3+/5  MMT strength: 5/5       Gait:  Weight bearing precautions: WBAT  Assistive device: none  Ambulation distance and deviations:  Stairs: 4 steps to enter home - says he goes up sideways  Comments:      Clinical Special Tests:    Ankle  - not performed secondary to postop    Comments: marked swelling, pain w/ gait, decreased dorsiflexion/eversion/inversion range of motion     Intake Outcome Measure for FOTO Ankle Survey    Therapist reviewed FOTO scores for Pablo Tabares on 4/22/2024.   FOTO report - see Media section or FOTO account episode details.    Intake Score: 44         Clinical Information for Insurance Authorization     Dates of Services: 04/22/2024 to 06/14/2024  Discharge Plan: Patient will be discharged from skilled physical therapy treatment once all goals have been met, patient has plateaued, or physician/insurance requests discontinuation of care. Patient will be discharged with a home exercise program.   Type of therapy: Rehabilitative  ICD-10 Diagnosis Code(s): S82.831D   Which side is symptomatic? Right  Surgical: Yes  Surgical procedure:  open reduction with internal fixation   Surgery date:  1/23/2024 .  Presenting symptoms/diagnoses are unspecified in nature.  Presenting symptoms are  non-radiating in nature.   The rehabilitation is not related to a diagnosis of cancer.  The rehabilitation is not related to a diagnosis of lymphedema.  Patient's clinical presentation is:  Severe objective and functional deficits: consistent intense symptoms with severe loss of range of motion, strength, or ability to perform daily tasks  CPT Codes Requested:  95938 [therapeutic exercise], 34130 [neuromuscular re-education], 90289 [gait training], 26154 [manual therapy], 88879 [therapeutic activities], 67492 [unattended electrical stimulation], and 24458 [vasopneumatic device]     Treatment     Total Treatment time (time-based codes) separate from Evaluation: 10 minutes     Pablo received the treatments listed below:      Towel calf stretch, plantarflexion w/ green band, ankle circles cw and ccw w/ green band    Patient Education and Home Exercises     Education provided:   - evaluation results, plan of care and home exercise program     Written Home Exercises Provided: yes. Exercises were reviewed and Pablo was able to demonstrate them prior to the end of the session.  Pablo demonstrated good  understanding of the education provided. See EMR under Patient Instructions for exercises provided during therapy sessions.    Assessment     Pablo is a 48 y.o. male referred to outpatient Physical Therapy with a medical diagnosis of s/p ORIF right ankle. Patient presents with typical postop symptoms of marked swelling, decreased range of motion, decreased strength, gait disturbance, pain w/ ambulation, and inability to return to work yet. He is very limited with dorsiflexion/eversion/inversion and has the most pain with eversion/inversion. He ambulates with toe out gait due to decreased dorsiflexion range of motion. Corrected how he was descending stairs today. 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      Patient prognosis is Good.   Patient will benefit from skilled outpatient Physical Therapy to address the deficits stated above and in the chart below, provide patient /family education, and to maximize patientt's level of independence.     Plan of care discussed with patient: Yes  Patient's spiritual, cultural and educational needs considered and patient is agreeable to the plan of care and goals as stated below:     Anticipated Barriers for therapy: none    Medical Necessity is demonstrated by the following  History  Co-morbidities and personal factors that may impact the plan of care [] LOW: no personal factors / co-morbidities  [x] MODERATE: 1-2 personal factors / co-morbidities  [] HIGH: 3+ personal factors / co-morbidities    Moderate / High Support Documentation:   Co-morbidities affecting plan of care: decreased range of motion, pain with ambulation, significant swelling     Personal Factors:   no deficits     Examination  Body Structures and Functions, activity limitations and participation restrictions that may impact the plan of care [x] LOW: addressing 1-2 elements  [] MODERATE: 3+ elements  [] HIGH: 4+ elements (please support below)    Moderate / High Support Documentation: pain, swelling, limited range of motion, pain with gait     Clinical Presentation [x] LOW: stable  [] MODERATE: Evolving  [] HIGH: Unstable     Decision Making/ Complexity Score: low       Goals:  SHORT TERM GOALS  1.  Patient to be independent with home exercise program to facilitate carryover between therapy visits.  2.  Patient will have 8-10 degrees of plantarflexion, 10-15 degrees of eversion, and 20-25 degrees inversion range of motion right ankle for improved gait and mobility.  3.  Patient will perform straight leg raise and hold 10 seconds without quad lag on right lower extremity for increased quad control.  4.  Patient will increase manual muscle test of right ankle/lower extremity to 5/5 for increased stability with  gait and activiites of daily living.    LONG TERM GOALS  1.  Patient will go up/down stairs reciprocal pattern without handrails and good eccentric control on right lower extremity.  2.  Patient will ambulate independent on all surfaces without deviation and without pain in right ankle.  3.  Patient will be able to climb on and off his truck and secure/unsecure loads without pain or difficulty to be able to return to work.   Plan     Plan of care Certification: 4/22/2024 to 6/14/2024.    Outpatient Physical Therapy 2 times weekly for 8 weeks to include the following interventions: Electrical Stimulation IFC/premod as needed, Gait Training, Manual Therapy, Moist Heat/ Ice, Neuromuscular Re-ed, Patient Education, Therapeutic Activities, and Therapeutic Exercise.     CARLOS AL PT        Physician's Signature: _________________________________________ Date: ________________

## 2024-04-23 PROBLEM — S82.831D CLOSED FRACTURE OF DISTAL END OF RIGHT FIBULA WITH ROUTINE HEALING: Status: ACTIVE | Noted: 2024-04-23

## 2024-04-23 PROBLEM — Z87.81 STATUS POST ORIF OF FRACTURE OF ANKLE: Status: ACTIVE | Noted: 2024-04-23

## 2024-04-23 PROBLEM — Z98.890 STATUS POST ORIF OF FRACTURE OF ANKLE: Status: ACTIVE | Noted: 2024-04-23

## 2024-04-23 PROBLEM — R26.9 GAIT DISTURBANCE: Status: ACTIVE | Noted: 2024-04-23

## 2024-04-23 PROBLEM — M25.471: Status: ACTIVE | Noted: 2024-04-23

## 2024-04-23 PROBLEM — M25.671 STIFFNESS OF RIGHT ANKLE JOINT: Status: ACTIVE | Noted: 2024-04-23

## 2024-04-23 NOTE — PATIENT INSTRUCTIONS
Towel calf stretch - Gastroc 3 x 30 sec - 3 times a day  Plantarflexion w/ green band x 30 - 3 times a day  Ankle circles w/ green band - cw/ccw x 30 each - 3 times a day

## 2024-04-25 ENCOUNTER — CLINICAL SUPPORT (OUTPATIENT)
Dept: REHABILITATION | Facility: HOSPITAL | Age: 49
End: 2024-04-25
Attending: ORTHOPAEDIC SURGERY
Payer: COMMERCIAL

## 2024-04-25 DIAGNOSIS — Z98.890 STATUS POST ORIF OF FRACTURE OF ANKLE: Primary | ICD-10-CM

## 2024-04-25 DIAGNOSIS — R26.9 GAIT DISTURBANCE: ICD-10-CM

## 2024-04-25 DIAGNOSIS — M25.471 SWELLING OF JOINT, ANKLE, RIGHT: ICD-10-CM

## 2024-04-25 DIAGNOSIS — Z87.81 STATUS POST ORIF OF FRACTURE OF ANKLE: Primary | ICD-10-CM

## 2024-04-25 DIAGNOSIS — M25.671 STIFFNESS OF RIGHT ANKLE JOINT: ICD-10-CM

## 2024-04-25 PROCEDURE — 97110 THERAPEUTIC EXERCISES: CPT | Mod: CQ

## 2024-04-25 PROCEDURE — 97140 MANUAL THERAPY 1/> REGIONS: CPT | Mod: CQ

## 2024-04-25 NOTE — PROGRESS NOTES
[facility]   Physical Therapy Treatment Note      Name: Pablo Tabares  Clinic Number: [id number]    Therapy Diagnosis: No diagnosis found.  Physician: Ian Dorman MD    Visit Date: 4/25/2024    Physician Orders: PT Eval and Treat   Medical Diagnosis from Referral: see  Evaluation Date: 4/22/2024  Authorization Period Expiration: 4/22/2024 - evaluation only approved  Plan of Care Expiration: 6/14/2024     Date of Surgery: 1/23/2024  Visit # / Visits authorized: 2/13    FOTO: 1/ 3     Precautions: Standard   PTA Visit #: 1/5     Time In: 1:45 pm  Time Out: 2:35 pm  Total Billable Time: 50 minutes    Subjective     Pt reports: No pain on arrival.  Reports pain @ rest sometimes and with activities that require endurance such as walking.  .  He was compliant with home exercise program.  Response to previous treatment: no complaints   Functional change: no change noted    Pain: 0/10  Location: right ankles     Objective      Range of motion:  Motion Right Left    Ankle DF +2 degrees   +8 degrees    Ankle PF   60 degrees   WITHIN FUNCTIONAL LIMITS   Ankle Inversion 15 degrees   35 degrees    Ankle Eversion 15 degrees   18 degrees        Treatment     Pablo received the treatments listed below:      therapeutic exercises to develop strength, endurance, ROM, flexibility, posture, and core stabilization for 40 minutes including:  Towel calf stretch--3 x 30 sec  Ankle circles cw and ccw w/ green band--3 x 10 reps  Seated soleus raise--10#--3 x 10  Seared soleus stretch--3 x 30 sec  Slant board--3 x 1 min  Bike x 5 min    manual therapy techniques: Joint mobilizations, Manual traction, Myofacial release, Soft tissue Mobilization, and Friction Massage were applied to the: R ankle for 10 minutes, including:  MRE and manual DF stretch    neuromuscular re-education activities to improve: Balance, Coordination, Kinesthetic Sense, Proprioception, and Posture for 0  minutes. The following activities  were included:  -    therapeutic activities to improve functional performance for 0  minutes, including:  -    Patient Education and Home Exercises       Education provided:   - review of home exercise program and current Plan of Care/rationale of treatment.    Written Home Exercises Provided: Patient instructed to cont prior HEP. Exercises were reviewed and Pablo was able to demonstrate them prior to the end of the session.  Pablo demonstrated good understanding of the education provided. See EMR under Patient Instructions for exercises provided during therapy sessions    Assessment     At Evaluation:  Pablo is a 48 y.o. male referred to outpatient Physical Therapy with a medical diagnosis of s/p ORIF right ankle. Patient presents with typical postop symptoms of marked swelling, decreased range of motion, decreased strength, gait disturbance, pain w/ ambulation, and inability to return to work yet. He is very limited with dorsiflexion/eversion/inversion and has the most pain with eversion/inversion. He ambulates with toe out gait due to decreased dorsiflexion range of motion. Corrected how he was descending stairs today. 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     Current Assessment:  Patient arrived without ankle pain.  Report he has pain with sometimes @ rest and with activities that requires endurance such as walking distances.  ROM better today.  Spent session working on ROM and lower level strengthening activities.  Will continue POC and progress as needed    Pablo Is progressing towards his goals.   Pt prognosis is Good.     Pt will continue to benefit from skilled outpatient physical therapy to address the deficits listed in the problem list box on initial evaluation, provide pt/family education and to maximize pt's level of independence in the home and community environment.     Pt's  spiritual, cultural and educational needs considered and pt agreeable to plan of care and goals.     Anticipated barriers to physical therapy: none    Goals:   Goals:  SHORT TERM GOALS  1.  Patient to be independent with home exercise program to facilitate carryover between therapy visits.  2.  Patient will have 8-10 degrees of plantarflexion, 10-15 degrees of eversion, and 20-25 degrees inversion range of motion right ankle for improved gait and mobility.  3.  Patient will perform straight leg raise and hold 10 seconds without quad lag on right lower extremity for increased quad control.  4.  Patient will increase manual muscle test of right ankle/lower extremity to 5/5 for increased stability with gait and activiites of daily living.     LONG TERM GOALS  1.  Patient will go up/down stairs reciprocal pattern without handrails and good eccentric control on right lower extremity.  2.  Patient will ambulate independent on all surfaces without deviation and without pain in right ankle.  3.  Patient will be able to climb on and off his truck and secure/unsecure loads without pain or difficulty to be able to return to work.     Plan     Plan of care Certification: 4/22/2024 to 6/14/2024.     Outpatient Physical Therapy 2 times weekly for 8 weeks to include the following interventions: Electrical Stimulation IFC/premod as needed, Gait Training, Manual Therapy, Moist Heat/ Ice, Neuromuscular Re-ed, Patient Education, Therapeutic Activities, and Therapeutic Exercise    Braulio Moreland, KIRSTEN   04/25/2024

## 2024-04-30 ENCOUNTER — CLINICAL SUPPORT (OUTPATIENT)
Dept: REHABILITATION | Facility: HOSPITAL | Age: 49
End: 2024-04-30
Attending: ORTHOPAEDIC SURGERY
Payer: COMMERCIAL

## 2024-04-30 DIAGNOSIS — Z98.890 STATUS POST ORIF OF FRACTURE OF ANKLE: Primary | ICD-10-CM

## 2024-04-30 DIAGNOSIS — M25.471 SWELLING OF JOINT, ANKLE, RIGHT: ICD-10-CM

## 2024-04-30 DIAGNOSIS — M25.671 STIFFNESS OF RIGHT ANKLE JOINT: ICD-10-CM

## 2024-04-30 DIAGNOSIS — R26.9 GAIT DISTURBANCE: ICD-10-CM

## 2024-04-30 DIAGNOSIS — Z87.81 STATUS POST ORIF OF FRACTURE OF ANKLE: Primary | ICD-10-CM

## 2024-04-30 PROCEDURE — 97140 MANUAL THERAPY 1/> REGIONS: CPT | Mod: CQ

## 2024-04-30 NOTE — PROGRESS NOTES
[facility]   Physical Therapy Treatment Note      Name: Pablo Tabares  Clinic Number: [id number]    Therapy Diagnosis:   Encounter Diagnoses   Name Primary?    Status post ORIF of fracture of ankle Yes    Gait disturbance     Stiffness of right ankle joint     Swelling of joint, ankle, right      Physician: Ian Dorman MD    Visit Date: 4/30/2024    Physician Orders: PT Eval and Treat   Medical Diagnosis from Referral: see  Evaluation Date: 4/22/2024  Authorization Period Expiration: 4/22/2024 - evaluation only approved  Plan of Care Expiration: 6/14/2024     Date of Surgery: 1/23/2024  Visit # / Visits authorized: 3/13    FOTO: 1/ 3     Precautions: Standard   PTA Visit #: 2/5     Time In: 3:15 pm  Time Out:  4:15 pm  Total Billable Time:  8 individual minutes (52 non billed minutes)    Subjective     Pt reports: soreness at all times on scar.  Reports pain @ rest sometimes and with activities that require endurance such as walking.  .  He was compliant with home exercise program.  Response to previous treatment: no complaints   Functional change: no change noted    Pain: 0/10  Location: right ankles     Objective      Range of motion:  Motion Right Left    Ankle DF +5 degrees   +8 degrees    Ankle PF   60 degrees   WITHIN FUNCTIONAL LIMITS   Ankle Inversion 20 degrees   35 degrees    Ankle Eversion 15 degrees   18 degrees        Treatment     Pablo received the treatments listed below:      therapeutic exercises to develop strength, endurance, ROM, flexibility, posture, and core stabilization for 0 minutes including:  Towel calf stretch--3 x 30 sec  Ankle circles cw and ccw w/ green band--3 x 10 reps  Seated soleus raise--15#--3 x 10  Seared soleus stretch--3 x 30 sec  Slant board--3 x 1 min  Bike x 5 min  Seated DF--20 x 10 sex  Functional DF stretch--    manual therapy techniques: Joint mobilizations, Manual traction, Myofacial release, Soft tissue Mobilization, and Friction  Massage were applied to the: R ankle for 8 minutes, including:  MRE and manual DF stretch    neuromuscular re-education activities to improve: Balance, Coordination, Kinesthetic Sense, Proprioception, and Posture for 0  minutes. The following activities were included:  -    therapeutic activities to improve functional performance for 0  minutes, including:  -    Patient Education and Home Exercises       Education provided:   - review of home exercise program and current Plan of Care/rationale of treatment.    Written Home Exercises Provided: Patient instructed to cont prior HEP. Exercises were reviewed and Pablo was able to demonstrate them prior to the end of the session.  Pablo demonstrated good understanding of the education provided. See EMR under Patient Instructions for exercises provided during therapy sessions    Assessment     At Evaluation:  Pablo is a 48 y.o. male referred to outpatient Physical Therapy with a medical diagnosis of s/p ORIF right ankle. Patient presents with typical postop symptoms of marked swelling, decreased range of motion, decreased strength, gait disturbance, pain w/ ambulation, and inability to return to work yet. He is very limited with dorsiflexion/eversion/inversion and has the most pain with eversion/inversion. He ambulates with toe out gait due to decreased dorsiflexion range of motion. Corrected how he was descending stairs today. 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     Current Assessment:  Patient arrived without ankle pain.  Report he has pain with sometimes @ rest and with activities that requires endurance such as walking distances.  ROM better today.  Spent session working on ROM and lower level strengthening activities.  Will continue POC and progress as needed    Pablo Is progressing towards his goals.   Pt prognosis is Good.     Pt will  continue to benefit from skilled outpatient physical therapy to address the deficits listed in the problem list box on initial evaluation, provide pt/family education and to maximize pt's level of independence in the home and community environment.     Pt's spiritual, cultural and educational needs considered and pt agreeable to plan of care and goals.     Anticipated barriers to physical therapy: none    Goals:   Goals:  SHORT TERM GOALS  1.  Patient to be independent with home exercise program to facilitate carryover between therapy visits.  2.  Patient will have 8-10 degrees of plantarflexion, 10-15 degrees of eversion, and 20-25 degrees inversion range of motion right ankle for improved gait and mobility.  3.  Patient will perform straight leg raise and hold 10 seconds without quad lag on right lower extremity for increased quad control.  4.  Patient will increase manual muscle test of right ankle/lower extremity to 5/5 for increased stability with gait and activiites of daily living.     LONG TERM GOALS  1.  Patient will go up/down stairs reciprocal pattern without handrails and good eccentric control on right lower extremity.  2.  Patient will ambulate independent on all surfaces without deviation and without pain in right ankle.  3.  Patient will be able to climb on and off his truck and secure/unsecure loads without pain or difficulty to be able to return to work.     Plan     Plan of care Certification: 4/22/2024 to 6/14/2024.     Outpatient Physical Therapy 2 times weekly for 8 weeks to include the following interventions: Electrical Stimulation IFC/premod as needed, Gait Training, Manual Therapy, Moist Heat/ Ice, Neuromuscular Re-ed, Patient Education, Therapeutic Activities, and Therapeutic Exercise    Braulio Moreland, KIRSTEN   04/30/2024

## 2024-05-02 ENCOUNTER — CLINICAL SUPPORT (OUTPATIENT)
Dept: REHABILITATION | Facility: HOSPITAL | Age: 49
End: 2024-05-02
Attending: ORTHOPAEDIC SURGERY

## 2024-05-02 DIAGNOSIS — R26.9 GAIT DISTURBANCE: ICD-10-CM

## 2024-05-02 DIAGNOSIS — Z98.890 STATUS POST ORIF OF FRACTURE OF ANKLE: Primary | ICD-10-CM

## 2024-05-02 DIAGNOSIS — M25.671 STIFFNESS OF RIGHT ANKLE JOINT: ICD-10-CM

## 2024-05-02 DIAGNOSIS — M25.471 SWELLING OF JOINT, ANKLE, RIGHT: ICD-10-CM

## 2024-05-02 DIAGNOSIS — Z87.81 STATUS POST ORIF OF FRACTURE OF ANKLE: Primary | ICD-10-CM

## 2024-05-02 PROCEDURE — 97112 NEUROMUSCULAR REEDUCATION: CPT | Mod: CQ

## 2024-05-02 PROCEDURE — 97110 THERAPEUTIC EXERCISES: CPT | Mod: CQ

## 2024-05-07 ENCOUNTER — CLINICAL SUPPORT (OUTPATIENT)
Dept: REHABILITATION | Facility: HOSPITAL | Age: 49
End: 2024-05-07
Attending: ORTHOPAEDIC SURGERY

## 2024-05-07 DIAGNOSIS — R26.9 GAIT DISTURBANCE: ICD-10-CM

## 2024-05-07 DIAGNOSIS — M25.471 SWELLING OF JOINT, ANKLE, RIGHT: ICD-10-CM

## 2024-05-07 DIAGNOSIS — Z98.890 STATUS POST ORIF OF FRACTURE OF ANKLE: Primary | ICD-10-CM

## 2024-05-07 DIAGNOSIS — M25.671 STIFFNESS OF RIGHT ANKLE JOINT: ICD-10-CM

## 2024-05-07 DIAGNOSIS — Z87.81 STATUS POST ORIF OF FRACTURE OF ANKLE: Primary | ICD-10-CM

## 2024-05-07 PROCEDURE — 97112 NEUROMUSCULAR REEDUCATION: CPT | Mod: CQ

## 2024-05-07 PROCEDURE — 97140 MANUAL THERAPY 1/> REGIONS: CPT | Mod: CQ

## 2024-05-07 NOTE — PROGRESS NOTES
[facility]   Physical Therapy Treatment Note      Name: Pablo Tabares  Clinic Number: [id number]    Therapy Diagnosis:   Encounter Diagnoses   Name Primary?    Status post ORIF of fracture of ankle Yes    Gait disturbance     Stiffness of right ankle joint     Swelling of joint, ankle, right      Physician: Ian Dorman MD    Visit Date: 5/7/2024    Physician Orders: PT Eval and Treat   Medical Diagnosis from Referral: see  Evaluation Date: 4/22/2024  Authorization Period Expiration: 4/22/2024 -   Plan of Care Expiration: 6/14/2024     Date of Surgery: 1/23/2024  Visit # / Visits authorized: 5/13    FOTO: 1/ 3     Precautions: Standard   PTA Visit #: 4/5     Time In: 11:32 am  Time Out:  12:20 pm  Total Billable Time:  24 individual minutes (24 min non billed)    Subjective     Pt reports: sore in both sides of ankle  He was compliant with home exercise program.  Response to previous treatment: no complaints   Functional change: no change noted    Pain: 0/10  Location: right ankle     Objective      Range of motion:  Motion Right Left    Ankle DF +8 degrees   +8 degrees    Ankle PF   60 degrees   WITHIN FUNCTIONAL LIMITS   Ankle Inversion 25 degrees   35 degrees    Ankle Eversion 20 degrees   18 degrees      Treatment     Pablo received the treatments listed below:      therapeutic exercises to develop strength, endurance, ROM, flexibility, posture, and core stabilization for 0 minutes including:  Towel calf stretch--3 x 30 sec  Ankle circles cw and ccw w/ blue band  Seated soleus raise--3 plates--3 x 10  Seated soleus stretch--3 x 30 sec  Slant board--3 x 1 min  Bike x 5 minutes   Functional DF stretch  Standing calf raises with heel drop b/t each set--3 x 10     (Not Today)  Seated DF--with 15 lb db x 30    manual therapy techniques: Joint mobilizations, Manual traction, Myofacial release, Soft tissue Mobilization, and Friction Massage were applied to the: R ankle for 8 minutes,  including:  Mulligan mobs    (Not Today)  MRE and manual DF stretch    neuromuscular re-education activities to improve: Balance, Coordination, Kinesthetic Sense, Proprioception, and Posture for  16 minutes. The following activities were included:  Multiaxis ankle x 30 in eversion/inversion   Multiaxis ankle x 30 in plantarflexion/dorsiflexion   plantarflexion/dorsiflexion on wobble board x 30    therapeutic activities to improve functional performance for 0  minutes, including:  -    Patient Education and Home Exercises       Education provided:   - review of home exercise program and current Plan of Care/rationale of treatment.    Written Home Exercises Provided: Patient instructed to cont prior HEP. Exercises were reviewed and Pablo was able to demonstrate them prior to the end of the session.  Pablo demonstrated good understanding of the education provided. See EMR under Patient Instructions for exercises provided during therapy sessions    Assessment     At Evaluation:  Pablo is a 48 y.o. male referred to outpatient Physical Therapy with a medical diagnosis of s/p ORIF right ankle. Patient presents with typical postop symptoms of marked swelling, decreased range of motion, decreased strength, gait disturbance, pain w/ ambulation, and inability to return to work yet. He is very limited with dorsiflexion/eversion/inversion and has the most pain with eversion/inversion. He ambulates with toe out gait due to decreased dorsiflexion range of motion. Corrected how he was descending stairs today. 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     Current Assessment:  Pablo continues to ambulate with foot in slight external rotation due to lack of dorsiflexion. Added mulligan mobs to help with functional DF.  He did well with with all activities without complaints.  Treatment was again focused on  strength and range of motion. Will continue to progress as able.    Pablo Is progressing towards his goals.   Pt prognosis is Good.     Pt will continue to benefit from skilled outpatient physical therapy to address the deficits listed in the problem list box on initial evaluation, provide pt/family education and to maximize pt's level of independence in the home and community environment.     Pt's spiritual, cultural and educational needs considered and pt agreeable to plan of care and goals.     Anticipated barriers to physical therapy: none    Goals:  SHORT TERM GOALS  1.  Patient to be independent with home exercise program to facilitate carryover between therapy visits.  2.  Patient will have 8-10 degrees of plantarflexion, 10-15 degrees of eversion, and 20-25 degrees inversion range of motion right ankle for improved gait and mobility.  3.  Patient will perform straight leg raise and hold 10 seconds without quad lag on right lower extremity for increased quad control.  4.  Patient will increase manual muscle test of right ankle/lower extremity to 5/5 for increased stability with gait and activiites of daily living.     LONG TERM GOALS  1.  Patient will go up/down stairs reciprocal pattern without handrails and good eccentric control on right lower extremity.  2.  Patient will ambulate independent on all surfaces without deviation and without pain in right ankle.  3.  Patient will be able to climb on and off his truck and secure/unsecure loads without pain or difficulty to be able to return to work.     Plan     Plan of care Certification: 4/22/2024 to 6/14/2024.     Outpatient Physical Therapy 2 times weekly for 8 weeks to include the following interventions: Electrical Stimulation IFC/premod as needed, Gait Training, Manual Therapy, Moist Heat/ Ice, Neuromuscular Re-ed, Patient Education, Therapeutic Activities, and Therapeutic Exercise    Braulio Moreland, KIRSTEN   05/07/2024

## 2024-05-09 ENCOUNTER — CLINICAL SUPPORT (OUTPATIENT)
Dept: REHABILITATION | Facility: HOSPITAL | Age: 49
End: 2024-05-09
Attending: ORTHOPAEDIC SURGERY

## 2024-05-09 DIAGNOSIS — R26.9 GAIT DISTURBANCE: ICD-10-CM

## 2024-05-09 DIAGNOSIS — M25.671 STIFFNESS OF RIGHT ANKLE JOINT: ICD-10-CM

## 2024-05-09 DIAGNOSIS — Z87.81 STATUS POST ORIF OF FRACTURE OF ANKLE: Primary | ICD-10-CM

## 2024-05-09 DIAGNOSIS — M25.471 SWELLING OF JOINT, ANKLE, RIGHT: ICD-10-CM

## 2024-05-09 DIAGNOSIS — Z98.890 STATUS POST ORIF OF FRACTURE OF ANKLE: Primary | ICD-10-CM

## 2024-05-09 PROCEDURE — 97530 THERAPEUTIC ACTIVITIES: CPT | Mod: CQ

## 2024-05-09 PROCEDURE — 97112 NEUROMUSCULAR REEDUCATION: CPT | Mod: CQ

## 2024-05-09 PROCEDURE — 97110 THERAPEUTIC EXERCISES: CPT | Mod: CQ

## 2024-05-09 NOTE — PROGRESS NOTES
[facility]   Physical Therapy Treatment Note      Name: Pablo Tabares  Clinic Number: [id number]    Therapy Diagnosis:   Encounter Diagnoses   Name Primary?    Status post ORIF of fracture of ankle Yes    Gait disturbance     Stiffness of right ankle joint     Swelling of joint, ankle, right      Physician: Ian Dorman MD    Visit Date: 5/9/2024    Physician Orders: PT Eval and Treat   Medical Diagnosis from Referral: see  Evaluation Date: 4/22/2024  Authorization Period Expiration: 4/22/2024 -   Plan of Care Expiration: 6/14/2024     Date of Surgery: 1/23/2024  Visit # / Visits authorized: 6/13    FOTO: 1/ 3     Precautions: Standard   PTA Visit #: 5/5     Time In: 1:46 pm  Time Out:  2:25 pm  Total Billable Time:  39 individual minutes    Subjective     Pt reports: ankle remains sore.  He was compliant with home exercise program.  Response to previous treatment: no complaints   Functional change: no change noted    Pain: 0/10  Location: right ankle     Objective      Range of motion:  Motion Right Left    Ankle DF +8 degrees   +8 degrees    Ankle PF   60 degrees   WITHIN FUNCTIONAL LIMITS   Ankle Inversion 25 degrees   35 degrees    Ankle Eversion 20 degrees   18 degrees      Treatment     Pablo received the treatments listed below:      therapeutic exercises to develop strength, endurance, ROM, flexibility, posture, and core stabilization for 15 minutes including:  Bike x 5 minutes   Slant board--3 x 1 minutes   Seated soleus raise--3 plates--3 x 10  Seated soleus stretch--3 x 30 sec  Seated DF--with 15 lb db x 30    (not performed today)   Standing calf raises with heel drop b/t each set--3 x 10     manual therapy techniques: Joint mobilizations, Manual traction, Myofacial release, Soft tissue Mobilization, and Friction Massage were applied to the: R ankle for 5 minutes, including:  Mulligan mobs    (Not Today)  MRE and manual DF stretch    neuromuscular re-education activities  to improve: Balance, Coordination, Kinesthetic Sense, Proprioception, and Posture for  11 minutes. The following activities were included:  Multiaxis ankle x 30 in eversion/inversion   Multiaxis ankle x 30 in plantarflexion/dorsiflexion   Unilateral plantarflexion/dorsiflexion on wobble board x 30    therapeutic activities to improve functional performance for 8 minutes, including:  Unilateral leg press 4 plates x 30  Unilateral calf raises 4 plates x 30    Patient Education and Home Exercises       Education provided:   - review of home exercise program and current Plan of Care/rationale of treatment.    Written Home Exercises Provided: Patient instructed to cont prior HEP. Exercises were reviewed and Pablo was able to demonstrate them prior to the end of the session.  Pablo demonstrated good understanding of the education provided. See EMR under Patient Instructions for exercises provided during therapy sessions    Assessment     At Evaluation:  Pablo is a 48 y.o. male referred to outpatient Physical Therapy with a medical diagnosis of s/p ORIF right ankle. Patient presents with typical postop symptoms of marked swelling, decreased range of motion, decreased strength, gait disturbance, pain w/ ambulation, and inability to return to work yet. He is very limited with dorsiflexion/eversion/inversion and has the most pain with eversion/inversion. He ambulates with toe out gait due to decreased dorsiflexion range of motion. Corrected how he was descending stairs today. 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     Current Assessment:  Pablo complained of tightness in his ankle on arrival that felt better after Mulligan mobilizations. Treatment was again focused on strength and range of motion. He was able  to add unilateral leg press and calf raises on leg press with report of burning but not pain.  Will continue to progress as able.    Pablo Is progressing towards his goals.   Pt prognosis is Good.     Pt will continue to benefit from skilled outpatient physical therapy to address the deficits listed in the problem list box on initial evaluation, provide pt/family education and to maximize pt's level of independence in the home and community environment.     Pt's spiritual, cultural and educational needs considered and pt agreeable to plan of care and goals.     Anticipated barriers to physical therapy: none    Goals:  SHORT TERM GOALS  1.  Patient to be independent with home exercise program to facilitate carryover between therapy visits.  2.  Patient will have 8-10 degrees of plantarflexion, 10-15 degrees of eversion, and 20-25 degrees inversion range of motion right ankle for improved gait and mobility.  3.  Patient will perform straight leg raise and hold 10 seconds without quad lag on right lower extremity for increased quad control.  4.  Patient will increase manual muscle test of right ankle/lower extremity to 5/5 for increased stability with gait and activiites of daily living.     LONG TERM GOALS  1.  Patient will go up/down stairs reciprocal pattern without handrails and good eccentric control on right lower extremity.  2.  Patient will ambulate independent on all surfaces without deviation and without pain in right ankle.  3.  Patient will be able to climb on and off his truck and secure/unsecure loads without pain or difficulty to be able to return to work.     Plan     Plan of care Certification: 4/22/2024 to 6/14/2024.     Outpatient Physical Therapy 2 times weekly for 8 weeks to include the following interventions: Electrical Stimulation IFC/premod as needed, Gait Training, Manual Therapy, Moist Heat/ Ice, Neuromuscular Re-ed, Patient Education, Therapeutic Activities, and Therapeutic Exercise    Allyson Do, KIRSTEN   05/09/2024

## 2024-05-14 ENCOUNTER — CLINICAL SUPPORT (OUTPATIENT)
Dept: REHABILITATION | Facility: HOSPITAL | Age: 49
End: 2024-05-14
Attending: ORTHOPAEDIC SURGERY

## 2024-05-14 DIAGNOSIS — R26.9 GAIT DISTURBANCE: ICD-10-CM

## 2024-05-14 DIAGNOSIS — Z98.890 STATUS POST ORIF OF FRACTURE OF ANKLE: Primary | ICD-10-CM

## 2024-05-14 DIAGNOSIS — Z87.81 STATUS POST ORIF OF FRACTURE OF ANKLE: Primary | ICD-10-CM

## 2024-05-14 DIAGNOSIS — M25.671 STIFFNESS OF RIGHT ANKLE JOINT: ICD-10-CM

## 2024-05-14 DIAGNOSIS — M25.471 SWELLING OF JOINT, ANKLE, RIGHT: ICD-10-CM

## 2024-05-14 PROCEDURE — 97112 NEUROMUSCULAR REEDUCATION: CPT

## 2024-05-14 PROCEDURE — 97530 THERAPEUTIC ACTIVITIES: CPT

## 2024-05-14 PROCEDURE — 97110 THERAPEUTIC EXERCISES: CPT

## 2024-05-14 NOTE — PROGRESS NOTES
[facility]   Physical Therapy Treatment Note      Name: Pablo Tabares  Clinic Number: [id number]    Therapy Diagnosis:   Encounter Diagnoses   Name Primary?    Status post ORIF of fracture of ankle Yes    Gait disturbance     Stiffness of right ankle joint     Swelling of joint, ankle, right      Physician: Ian Dorman MD    Visit Date: 5/14/2024    Physician Orders: PT Eval and Treat   Medical Diagnosis from Referral: see  Evaluation Date: 4/22/2024  Authorization Period Expiration: 4/22/2024 -   Plan of Care Expiration: 6/14/2024     Date of Surgery: 1/23/2024  Visit # / Visits authorized: 7/13    FOTO: 1/ 3     Precautions: Standard   PTA Visit #: 0/5     Time In: 1:49 pm  Time Out: 2:40 pm  Total Billable Time: 46 individual minutes    Subjective     Pt reports: ankle remains sore but is getting better.  He was compliant with home exercise program.  Response to previous treatment: no complaints   Functional change: no change noted    Pain: 0/10  Location: right ankle     Objective      Range of motion:  Motion Right Left    Ankle DF +8 degrees   +8 degrees    Ankle PF   60 degrees   WITHIN FUNCTIONAL LIMITS   Ankle Inversion 25 degrees   35 degrees    Ankle Eversion 20 degrees   18 degrees      Treatment     Pablo received the treatments listed below:      therapeutic exercises to develop strength, endurance, ROM, flexibility, posture, and core stabilization for 19 minutes including:  Bike x 5 minutes   Slant board--3 x 30 sec - Gastroc  Seated soleus raise--4 plates--3 x 10  Slant board soleus stretch--3 x 30 sec  Seated DF--with 15 lb db 3 x 10    (not performed today)   Standing calf raises with heel drop b/t each set--3 x 10     manual therapy techniques: Joint mobilizations, Manual traction, Myofacial release, Soft tissue Mobilization, and Friction Massage were applied to the: R ankle for 5 minutes, including:  Mulligan mobs    (Not Today)  MRE and manual DF  stretch    neuromuscular re-education activities to improve: Balance, Coordination, Kinesthetic Sense, Proprioception, and Posture for  14 minutes. The following activities were included:  Multiaxis ankle x 30 in eversion/inversion   Multiaxis ankle x 30 in plantarflexion/dorsiflexion   Unilateral plantarflexion/dorsiflexion on wobble board x 30    therapeutic activities to improve functional performance for 8 minutes, including:  Unilateral leg press 5 plates x 30  Unilateral calf raises 4 plates -  Calf raises on hack squat 50# 3 x 10    Patient Education and Home Exercises       Education provided:   - review of home exercise program and current Plan of Care/rationale of treatment.    Written Home Exercises Provided: Patient instructed to cont prior HEP. Exercises were reviewed and Pablo was able to demonstrate them prior to the end of the session.  Pablo demonstrated good understanding of the education provided. See EMR under Patient Instructions for exercises provided during therapy sessions    Assessment     At Evaluation:  Pablo is a 48 y.o. male referred to outpatient Physical Therapy with a medical diagnosis of s/p ORIF right ankle. Patient presents with typical postop symptoms of marked swelling, decreased range of motion, decreased strength, gait disturbance, pain w/ ambulation, and inability to return to work yet. He is very limited with dorsiflexion/eversion/inversion and has the most pain with eversion/inversion. He ambulates with toe out gait due to decreased dorsiflexion range of motion. Corrected how he was descending stairs today. 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     Current Assessment:  Pablo continues to get relief with Mulligan mobilizations. Added calf raises on hack squat without difficulty. He was able to increase weight with single leg leg press and seated calf  raises. Treatment was again focused on strength and range of motion.  Will continue to progress as able.    Pablo Is progressing towards his goals.   Pt prognosis is Good.     Pt will continue to benefit from skilled outpatient physical therapy to address the deficits listed in the problem list box on initial evaluation, provide pt/family education and to maximize pt's level of independence in the home and community environment.     Pt's spiritual, cultural and educational needs considered and pt agreeable to plan of care and goals.     Anticipated barriers to physical therapy: none    Goals:  SHORT TERM GOALS  1.  Patient to be independent with home exercise program to facilitate carryover between therapy visits.  2.  Patient will have 8-10 degrees of plantarflexion, 10-15 degrees of eversion, and 20-25 degrees inversion range of motion right ankle for improved gait and mobility.  3.  Patient will perform straight leg raise and hold 10 seconds without quad lag on right lower extremity for increased quad control.  4.  Patient will increase manual muscle test of right ankle/lower extremity to 5/5 for increased stability with gait and activiites of daily living.     LONG TERM GOALS  1.  Patient will go up/down stairs reciprocal pattern without handrails and good eccentric control on right lower extremity.  2.  Patient will ambulate independent on all surfaces without deviation and without pain in right ankle.  3.  Patient will be able to climb on and off his truck and secure/unsecure loads without pain or difficulty to be able to return to work.     Plan     Plan of care Certification: 4/22/2024 to 6/14/2024.     Outpatient Physical Therapy 2 times weekly for 8 weeks to include the following interventions: Electrical Stimulation IFC/premod as needed, Gait Training, Manual Therapy, Moist Heat/ Ice, Neuromuscular Re-ed, Patient Education, Therapeutic Activities, and Therapeutic Exercise    CARLOS AL, PT    05/14/2024

## 2024-05-16 ENCOUNTER — CLINICAL SUPPORT (OUTPATIENT)
Dept: REHABILITATION | Facility: HOSPITAL | Age: 49
End: 2024-05-16
Attending: ORTHOPAEDIC SURGERY

## 2024-05-16 DIAGNOSIS — M25.471 SWELLING OF JOINT, ANKLE, RIGHT: ICD-10-CM

## 2024-05-16 DIAGNOSIS — R26.9 GAIT DISTURBANCE: ICD-10-CM

## 2024-05-16 DIAGNOSIS — M25.671 STIFFNESS OF RIGHT ANKLE JOINT: ICD-10-CM

## 2024-05-16 DIAGNOSIS — Z87.81 STATUS POST ORIF OF FRACTURE OF ANKLE: Primary | ICD-10-CM

## 2024-05-16 DIAGNOSIS — Z98.890 STATUS POST ORIF OF FRACTURE OF ANKLE: Primary | ICD-10-CM

## 2024-05-16 PROCEDURE — 97140 MANUAL THERAPY 1/> REGIONS: CPT | Mod: CQ

## 2024-05-16 NOTE — PROGRESS NOTES
[facility]   Physical Therapy Treatment Note      Name: Pablo Tabares  Clinic Number: [id number]    Therapy Diagnosis:   Encounter Diagnoses   Name Primary?    Status post ORIF of fracture of ankle Yes    Gait disturbance     Stiffness of right ankle joint     Swelling of joint, ankle, right      Physician: Ian Dorman MD    Visit Date: 5/16/2024    Physician Orders: PT Eval and Treat   Medical Diagnosis from Referral: see  Evaluation Date: 4/22/2024  Authorization Period Expiration: 4/22/2024 -   Plan of Care Expiration: 6/14/2024     Date of Surgery: 1/23/2024  Visit # / Visits authorized: 8/13    FOTO: 1/ 3     Precautions: Standard   PTA Visit #: 1/5     Time In: 11:30 am  Time Out: 12:21 pm  Total Billable Time: 8 individual minutes (43 min non billed)    Subjective     Pt reports: calf soreness after last session  He was compliant with home exercise program.  Response to previous treatment: no complaints   Functional change: no change noted    Pain: 0/10  Location: right ankle     Objective      Range of motion:  Motion Right Left    Ankle DF +8 degrees   +8 degrees    Ankle PF   60 degrees   WITHIN FUNCTIONAL LIMITS   Ankle Inversion 25 degrees   35 degrees    Ankle Eversion 20 degrees   18 degrees      Treatment     Pablo received the treatments listed below:      therapeutic exercises to develop strength, endurance, ROM, flexibility, posture, and core stabilization for 0 minutes including:  Bike x 5 minutes   Slant board--3 x 30 sec - Gastroc  Seated soleus raise--4 plates--3 x 10  Slant board soleus stretch--3 x 30 sec      (not performed today)   Seated DF--with 15 lb db 3 x 10  Standing calf raises with heel drop b/t each set--3 x 10     manual therapy techniques: Joint mobilizations, Manual traction, Myofacial release, Soft tissue Mobilization, and Friction Massage were applied to the: R ankle for 8 minutes, including:  Mulligan mobs    (Not Today)  MRE and manual DF  stretch    neuromuscular re-education activities to improve: Balance, Coordination, Kinesthetic Sense, Proprioception, and Posture for  0 minutes. The following activities were included:  Multiaxis ankle x 30 in eversion/inversion   Multiaxis ankle x 30 in plantarflexion/dorsiflexion   Unilateral plantarflexion/dorsiflexion on wobble board x 30  Heel walks--2 laps  SLS on pad--15 sec x 10    therapeutic activities to improve functional performance for 0 minutes, including:  Unilateral leg press 5 plates x 30  Unilateral calf raises 4 plates -  Calf raises on hack squat 50# 3 x 10    Patient Education and Home Exercises       Education provided:   - review of home exercise program and current Plan of Care/rationale of treatment.    Written Home Exercises Provided: Patient instructed to cont prior HEP. Exercises were reviewed and Pablo was able to demonstrate them prior to the end of the session.  Pablo demonstrated good understanding of the education provided. See EMR under Patient Instructions for exercises provided during therapy sessions    Assessment     At Evaluation:  Pablo is a 48 y.o. male referred to outpatient Physical Therapy with a medical diagnosis of s/p ORIF right ankle. Patient presents with typical postop symptoms of marked swelling, decreased range of motion, decreased strength, gait disturbance, pain w/ ambulation, and inability to return to work yet. He is very limited with dorsiflexion/eversion/inversion and has the most pain with eversion/inversion. He ambulates with toe out gait due to decreased dorsiflexion range of motion. Corrected how he was descending stairs today. 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     Current Assessment:  Pablo continues to get relief with Mulligan mobilizations. Had some calf soreness today.  Did do more stretching today.  Added heel walks  and SLS on pad today.  Treatment was again focused on strength and range of motion.  Will continue to progress as able.    Pablo Is progressing towards his goals.   Pt prognosis is Good.     Pt will continue to benefit from skilled outpatient physical therapy to address the deficits listed in the problem list box on initial evaluation, provide pt/family education and to maximize pt's level of independence in the home and community environment.     Pt's spiritual, cultural and educational needs considered and pt agreeable to plan of care and goals.     Anticipated barriers to physical therapy: none    Goals:  SHORT TERM GOALS  1.  Patient to be independent with home exercise program to facilitate carryover between therapy visits.  2.  Patient will have 8-10 degrees of plantarflexion, 10-15 degrees of eversion, and 20-25 degrees inversion range of motion right ankle for improved gait and mobility.  3.  Patient will perform straight leg raise and hold 10 seconds without quad lag on right lower extremity for increased quad control.  4.  Patient will increase manual muscle test of right ankle/lower extremity to 5/5 for increased stability with gait and activiites of daily living.     LONG TERM GOALS  1.  Patient will go up/down stairs reciprocal pattern without handrails and good eccentric control on right lower extremity.  2.  Patient will ambulate independent on all surfaces without deviation and without pain in right ankle.  3.  Patient will be able to climb on and off his truck and secure/unsecure loads without pain or difficulty to be able to return to work.     Plan     Plan of care Certification: 4/22/2024 to 6/14/2024.     Outpatient Physical Therapy 2 times weekly for 8 weeks to include the following interventions: Electrical Stimulation IFC/premod as needed, Gait Training, Manual Therapy, Moist Heat/ Ice, Neuromuscular Re-ed, Patient Education, Therapeutic Activities, and Therapeutic Exercise    Braulio Morleand, PTA    05/16/2024

## 2024-05-21 ENCOUNTER — CLINICAL SUPPORT (OUTPATIENT)
Dept: REHABILITATION | Facility: HOSPITAL | Age: 49
End: 2024-05-21
Attending: ORTHOPAEDIC SURGERY

## 2024-05-21 DIAGNOSIS — M25.671 STIFFNESS OF RIGHT ANKLE JOINT: ICD-10-CM

## 2024-05-21 DIAGNOSIS — Z87.81 STATUS POST ORIF OF FRACTURE OF ANKLE: Primary | ICD-10-CM

## 2024-05-21 DIAGNOSIS — R26.9 GAIT DISTURBANCE: ICD-10-CM

## 2024-05-21 DIAGNOSIS — M25.471 SWELLING OF JOINT, ANKLE, RIGHT: ICD-10-CM

## 2024-05-21 DIAGNOSIS — Z98.890 STATUS POST ORIF OF FRACTURE OF ANKLE: Primary | ICD-10-CM

## 2024-05-21 PROCEDURE — 97110 THERAPEUTIC EXERCISES: CPT | Mod: CQ

## 2024-05-21 PROCEDURE — 97530 THERAPEUTIC ACTIVITIES: CPT | Mod: CQ

## 2024-05-21 PROCEDURE — 97112 NEUROMUSCULAR REEDUCATION: CPT | Mod: CQ

## 2024-05-21 NOTE — PROGRESS NOTES
[facility]   Physical Therapy Treatment Note      Name: Pablo Tabares  Clinic Number: [id number]    Therapy Diagnosis:   Encounter Diagnoses   Name Primary?    Status post ORIF of fracture of ankle Yes    Gait disturbance     Stiffness of right ankle joint     Swelling of joint, ankle, right      Physician: Ian Dorman MD    Visit Date: 5/21/2024    Physician Orders: PT Eval and Treat   Medical Diagnosis from Referral: see  Evaluation Date: 4/22/2024  Authorization Period Expiration: 4/22/2024 -   Plan of Care Expiration: 6/14/2024     Date of Surgery: 1/23/2024  Visit # / Visits authorized: 9/13    FOTO: 1/ 3     Precautions: Standard   PTA Visit #: 2/5     Time In: 9:14 am  Time Out: 9:59 am  Total Billable Time: 45 minutes     Subjective     Pt reports: he feels like he is pushing against where they cut him - feels his biggest problem is still inversion/eversion.   He was compliant with home exercise program.  Response to previous treatment: no complaints   Functional change: no change noted    Pain: 0/10  Location: right ankle     Objective      Range of motion:  Motion Right Left    Ankle DF +8 degrees   +8 degrees    Ankle PF   60 degrees   WITHIN FUNCTIONAL LIMITS   Ankle Inversion 25 degrees   35 degrees    Ankle Eversion 20 degrees   18 degrees      Treatment     Pablo received the treatments listed below:      therapeutic exercises to develop strength, endurance, ROM, flexibility, posture, and core stabilization for 15 minutes including:  Bike x 5 minutes   Slant board--3 x 30 sec - Gastroc  Seated soleus raise--4 plates--3 x 30  Slant board soleus stretch--3 x 30 seconds  3 way calf raises x 15 each way    manual therapy techniques: Joint mobilizations, Manual traction, Myofacial release, Soft tissue Mobilization, and Friction Massage were applied to the: R ankle for 0 minutes, including:  Jalil barnett    neuromuscular re-education activities to improve: Balance,  Coordination, Kinesthetic Sense, Proprioception, and Posture for  22 minutes. The following activities were included:  Multiaxis ankle in eversion/inversion   Multiaxis ankle in plantarflexion/dorsiflexion   Unilateral plantarflexion/dorsiflexion on wobble board   Bilateral inversion/eversion on wobble board x 30  Heel walks--2 laps  SLS on pad--20 sec x 10    therapeutic activities to improve functional performance for 8 minutes, including:  Unilateral leg press 7 plates x 30  Unilateral calf raises 7 plates x 30  Calf raises on hack squat 50#     Patient Education and Home Exercises       Education provided:   - review of home exercise program and current Plan of Care/rationale of treatment.    Written Home Exercises Provided: Patient instructed to cont prior HEP. Exercises were reviewed and Pablo was able to demonstrate them prior to the end of the session.  Pablo demonstrated good understanding of the education provided. See EMR under Patient Instructions for exercises provided during therapy sessions    Assessment     At Evaluation:  Pablo is a 48 y.o. male referred to outpatient Physical Therapy with a medical diagnosis of s/p ORIF right ankle. Patient presents with typical postop symptoms of marked swelling, decreased range of motion, decreased strength, gait disturbance, pain w/ ambulation, and inability to return to work yet. He is very limited with dorsiflexion/eversion/inversion and has the most pain with eversion/inversion. He ambulates with toe out gait due to decreased dorsiflexion range of motion. Corrected how he was descending stairs today. 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     Current Assessment:  Pablo was able to hold single leg stance longer today. He did wobble board into inversion/eversion without complaint. Gait has improved but he does still turn into external  rotation a little to avoid dorsiflexion. Treatment was again focused on strength and range of motion.  Will continue to progress as able.    Pablo Is progressing towards his goals.   Pt prognosis is Good.     Pt will continue to benefit from skilled outpatient physical therapy to address the deficits listed in the problem list box on initial evaluation, provide pt/family education and to maximize pt's level of independence in the home and community environment.     Pt's spiritual, cultural and educational needs considered and pt agreeable to plan of care and goals.     Anticipated barriers to physical therapy: none    Goals:  SHORT TERM GOALS  1.  Patient to be independent with home exercise program to facilitate carryover between therapy visits.  2.  Patient will have 8-10 degrees of plantarflexion, 10-15 degrees of eversion, and 20-25 degrees inversion range of motion right ankle for improved gait and mobility.  3.  Patient will perform straight leg raise and hold 10 seconds without quad lag on right lower extremity for increased quad control.  4.  Patient will increase manual muscle test of right ankle/lower extremity to 5/5 for increased stability with gait and activiites of daily living.     LONG TERM GOALS  1.  Patient will go up/down stairs reciprocal pattern without handrails and good eccentric control on right lower extremity.  2.  Patient will ambulate independent on all surfaces without deviation and without pain in right ankle.  3.  Patient will be able to climb on and off his truck and secure/unsecure loads without pain or difficulty to be able to return to work.     Plan     Plan of care Certification: 4/22/2024 to 6/14/2024.     Outpatient Physical Therapy 2 times weekly for 8 weeks to include the following interventions: Electrical Stimulation IFC/premod as needed, Gait Training, Manual Therapy, Moist Heat/ Ice, Neuromuscular Re-ed, Patient Education, Therapeutic Activities, and Therapeutic  Exercise    Allyson Do, PTA   05/21/2024

## 2024-05-28 ENCOUNTER — CLINICAL SUPPORT (OUTPATIENT)
Dept: REHABILITATION | Facility: HOSPITAL | Age: 49
End: 2024-05-28
Attending: ORTHOPAEDIC SURGERY

## 2024-05-28 DIAGNOSIS — R26.9 GAIT DISTURBANCE: ICD-10-CM

## 2024-05-28 DIAGNOSIS — M25.671 STIFFNESS OF RIGHT ANKLE JOINT: ICD-10-CM

## 2024-05-28 DIAGNOSIS — Z98.890 STATUS POST ORIF OF FRACTURE OF ANKLE: Primary | ICD-10-CM

## 2024-05-28 DIAGNOSIS — M25.471 SWELLING OF JOINT, ANKLE, RIGHT: ICD-10-CM

## 2024-05-28 DIAGNOSIS — Z87.81 STATUS POST ORIF OF FRACTURE OF ANKLE: Primary | ICD-10-CM

## 2024-05-28 PROCEDURE — 97112 NEUROMUSCULAR REEDUCATION: CPT

## 2024-05-28 PROCEDURE — 97110 THERAPEUTIC EXERCISES: CPT

## 2024-05-28 PROCEDURE — 97530 THERAPEUTIC ACTIVITIES: CPT

## 2024-05-28 NOTE — PROGRESS NOTES
[facility]   Physical Therapy Treatment Note      Name: Pablo Tabares  Clinic Number: [id number]    Therapy Diagnosis:   Encounter Diagnoses   Name Primary?    Status post ORIF of fracture of ankle Yes    Gait disturbance     Stiffness of right ankle joint     Swelling of joint, ankle, right      Physician: Ian Dorman MD    Visit Date: 5/28/2024    Physician Orders: PT Eval and Treat   Medical Diagnosis from Referral: see  Evaluation Date: 4/22/2024  Authorization Period Expiration: 7/21/2024   Plan of Care Expiration: 6/14/2024     Date of Surgery: 1/23/2024  Visit # / Visits authorized: 10/13    FOTO: 1/3 = 44   2/3 = 58     Precautions: Standard   PTA Visit #: 0/5     Time In: 10:05 am  Time Out: 10:46 am  Total Billable Time: 39 minutes     Subjective     Pt reports: no new complaints  He was compliant with home exercise program.  Response to previous treatment: no complaints   Functional change: no change noted    Pain: 0/10  Location: right ankle     Objective      Range of motion:  Motion Right Left    Ankle DF +8 degrees   +8 degrees    Ankle PF   60 degrees   WITHIN FUNCTIONAL LIMITS   Ankle Inversion 25 degrees   35 degrees    Ankle Eversion 20 degrees   18 degrees      Treatment     Pablo received the treatments listed below:      therapeutic exercises to develop strength, endurance, ROM, flexibility, posture, and core stabilization for 15 minutes including:  Bike x 5 minutes   Slant board--3 x 30 sec - Gastroc  Seated soleus raise--6 plates--3 x 30  Slant board soleus stretch--3 x 30 seconds  3 way calf raises x 30 each way    manual therapy techniques: Joint mobilizations, Manual traction, Myofacial release, Soft tissue Mobilization, and Friction Massage were applied to the: R ankle for 0 minutes, including:  Jalil barnett    neuromuscular re-education activities to improve: Balance, Coordination, Kinesthetic Sense, Proprioception, and Posture for  15 minutes. The  "following activities were included:  Multiaxis ankle in eversion/inversion x 30 each way  Multiaxis ankle in plantarflexion/dorsiflexion x 30 each way  Heel walks--2 laps  SLS on pad--20 sec x 4  SLS on wobble board--20 sec x 4    (Not today)  Unilateral plantarflexion/dorsiflexion on wobble board   Bilateral inversion/eversion on wobble board x 30    therapeutic activities to improve functional performance for 9 minutes, including:  Unilateral calf raises on leg press - 8 plates x 30  Calf raises on hack squat 70# x 30- unilateral   Lateral step downs 6" x 30    (Not today)  Unilateral leg press 7 plates x 30    Patient Education and Home Exercises       Education provided:   - review of home exercise program and current Plan of Care/rationale of treatment.    Written Home Exercises Provided: Patient instructed to cont prior HEP. Exercises were reviewed and Pablo was able to demonstrate them prior to the end of the session.  Pablo demonstrated good understanding of the education provided. See EMR under Patient Instructions for exercises provided during therapy sessions    Assessment     At Evaluation:  Pablo is a 48 y.o. male referred to outpatient Physical Therapy with a medical diagnosis of s/p ORIF right ankle. Patient presents with typical postop symptoms of marked swelling, decreased range of motion, decreased strength, gait disturbance, pain w/ ambulation, and inability to return to work yet. He is very limited with dorsiflexion/eversion/inversion and has the most pain with eversion/inversion. He ambulates with toe out gait due to decreased dorsiflexion range of motion. Corrected how he was descending stairs today. 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     Current Assessment:  Pablo is progressing well. His only complaint is with eversion. He was able to hold single leg stance better " "on foam pad so we added single leg stance on wobble board which was challenging. He was able to increase weight with seated calf raises, hack squat calf raises, and leg press calf raises. Added lateral step downs from 6" and he was able to keep the heel down. Treatment continues to focus on strength and range of motion.  Will continue to progress as able.    Pablo Is progressing towards his goals.   Pt prognosis is Good.     Pt will continue to benefit from skilled outpatient physical therapy to address the deficits listed in the problem list box on initial evaluation, provide pt/family education and to maximize pt's level of independence in the home and community environment.     Pt's spiritual, cultural and educational needs considered and pt agreeable to plan of care and goals.     Anticipated barriers to physical therapy: none    Goals:  SHORT TERM GOALS  1.  Patient to be independent with home exercise program to facilitate carryover between therapy visits.  2.  Patient will have 8-10 degrees of plantarflexion, 10-15 degrees of eversion, and 20-25 degrees inversion range of motion right ankle for improved gait and mobility.  3.  Patient will perform straight leg raise and hold 10 seconds without quad lag on right lower extremity for increased quad control.  4.  Patient will increase manual muscle test of right ankle/lower extremity to 5/5 for increased stability with gait and activiites of daily living.     LONG TERM GOALS  1.  Patient will go up/down stairs reciprocal pattern without handrails and good eccentric control on right lower extremity.  2.  Patient will ambulate independent on all surfaces without deviation and without pain in right ankle.  3.  Patient will be able to climb on and off his truck and secure/unsecure loads without pain or difficulty to be able to return to work.     Plan     Plan of care Certification: 4/22/2024 to 6/14/2024.     Outpatient Physical Therapy 2 times weekly for 8 weeks to " include the following interventions: Electrical Stimulation IFC/premod as needed, Gait Training, Manual Therapy, Moist Heat/ Ice, Neuromuscular Re-ed, Patient Education, Therapeutic Activities, and Therapeutic Exercise    CARLOS AL, PT   05/28/2024

## 2024-05-30 ENCOUNTER — CLINICAL SUPPORT (OUTPATIENT)
Dept: REHABILITATION | Facility: HOSPITAL | Age: 49
End: 2024-05-30
Attending: ORTHOPAEDIC SURGERY

## 2024-05-30 DIAGNOSIS — M25.471 SWELLING OF JOINT, ANKLE, RIGHT: ICD-10-CM

## 2024-05-30 DIAGNOSIS — R26.9 GAIT DISTURBANCE: ICD-10-CM

## 2024-05-30 DIAGNOSIS — Z87.81 STATUS POST ORIF OF FRACTURE OF ANKLE: Primary | ICD-10-CM

## 2024-05-30 DIAGNOSIS — M25.671 STIFFNESS OF RIGHT ANKLE JOINT: ICD-10-CM

## 2024-05-30 DIAGNOSIS — Z98.890 STATUS POST ORIF OF FRACTURE OF ANKLE: Primary | ICD-10-CM

## 2024-05-30 PROCEDURE — 97530 THERAPEUTIC ACTIVITIES: CPT | Mod: CQ

## 2024-05-30 NOTE — PROGRESS NOTES
[facility]   Physical Therapy Treatment Note      Name: Pablo Tabares  Clinic Number: [id number]    Therapy Diagnosis:   Encounter Diagnoses   Name Primary?    Status post ORIF of fracture of ankle Yes    Gait disturbance     Stiffness of right ankle joint     Swelling of joint, ankle, right      Physician: Ian Dorman MD    Visit Date: 5/30/2024    Physician Orders: PT Eval and Treat   Medical Diagnosis from Referral: see  Evaluation Date: 4/22/2024  Authorization Period Expiration: 7/21/2024   Plan of Care Expiration: 7/12/2024     Date of Surgery: 1/23/2024  Visit # / Visits authorized: 11/13    FOTO: 1/3 = 44   2/3 = 58     Precautions: Standard   PTA Visit #: 1/5     Time In: 9:15 am  Time Out: 10:10 am  Total Billable Time: 8 individual (47 min non billed)     Subjective     Pt reports: no new complaints  He was compliant with home exercise program.  Response to previous treatment: no complaints   Functional change: no change noted    Pain: 0/10  Location: right ankle     Objective      Range of motion:  Motion Right Left    Ankle DF +10 degrees   +8 degrees    Ankle PF   60 degrees   WITHIN FUNCTIONAL LIMITS   Ankle Inversion 25 degrees   35 degrees    Ankle Eversion 20 degrees   18 degrees      Manual muscle test   Muscle Right  Left    Ankle DF  MMT strength: 4+/5  MMT strength: 5/5   Ankle PF  MMT strength: 5/5  MMT strength: 5/5   Ankle inversion  MMT strength: 4-/5  MMT strength: 5/5   Ankle eversion  MMT strength: 4-/5  MMT strength: 5/5         Gait:  Weight bearing precautions: WBAT  Assistive device: none  Ambulation distance and deviations: community distance without deviation  Stairs: Manages steps with reciprocal pattern and no handrails    Treatment     Pablo received the treatments listed below:      therapeutic exercises to develop strength, endurance, ROM, flexibility, posture, and core stabilization for 0 minutes including:  Bike x 5 minutes   Slant board--3  "x 30 sec - Gastroc  Seated soleus raise--6 plates--3 x 30  Slant board soleus stretch--3 x 30 seconds  3 way calf raises x 30 each way    manual therapy techniques: Joint mobilizations, Manual traction, Myofacial release, Soft tissue Mobilization, and Friction Massage were applied to the: R ankle for 0 minutes, including:  -    (Not Today)  Jalil barnett    neuromuscular re-education activities to improve: Balance, Coordination, Kinesthetic Sense, Proprioception, and Posture for  0-minutes. The following activities were included:  Multiaxis ankle in eversion/inversion x 30 each way  Multiaxis ankle in plantarflexion/dorsiflexion x 30 each way  Heel walks--2 laps  SLS on pad--20 sec x 4  SLS on wobble board--20 sec x 4    (Not today)  Unilateral plantarflexion/dorsiflexion on wobble board   Bilateral inversion/eversion on wobble board x 30    therapeutic activities to improve functional performance for 8 minutes, including:  Unilateral calf raises on leg press - 8 plates x 30  Calf raises on hack squat 70# x 30- unilateral   Lateral step downs 6" x 30    (Not today)  Unilateral leg press 7 plates x 30    Patient Education and Home Exercises       Education provided:   - review of home exercise program and current Plan of Care/rationale of treatment.    Written Home Exercises Provided: Patient instructed to cont prior HEP. Exercises were reviewed and Pablo was able to demonstrate them prior to the end of the session.  Pablo demonstrated good understanding of the education provided. See EMR under Patient Instructions for exercises provided during therapy sessions    Assessment     At Evaluation:  Pablo is a 48 y.o. male referred to outpatient Physical Therapy with a medical diagnosis of s/p ORIF right ankle. Patient presents with typical postop symptoms of marked swelling, decreased range of motion, decreased strength, gait disturbance, pain w/ ambulation, and inability to return to work yet. He is very limited with " dorsiflexion/eversion/inversion and has the most pain with eversion/inversion. He ambulates with toe out gait due to decreased dorsiflexion range of motion. Corrected how he was descending stairs today. 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     Current Assessment:  Pablo is progressing well. His only complaint is with eversion strength and balance issues in SL stance.  Treatment continues to focus on strength and range of motion.  Has met all goals except eversion strength and  ability to step up to return to work.  Will cont POC 4 weeks to work on functional  activities to return to work.  Cont POC    Pablo Is progressing towards his goals.   Pt prognosis is Good.     Pt will continue to benefit from skilled outpatient physical therapy to address the deficits listed in the problem list box on initial evaluation, provide pt/family education and to maximize pt's level of independence in the home and community environment.     Pt's spiritual, cultural and educational needs considered and pt agreeable to plan of care and goals.     Anticipated barriers to physical therapy: none    Goals:  SHORT TERM GOALS  1.  Patient to be independent with home exercise program to facilitate carryover between therapy visits.--Reports Compliance--MET  2.  Patient will have 8-10 degrees of plantarflexion, 10-15 degrees of eversion, and 20-25 degrees inversion range of motion right ankle for improved gait and mobility.--See Objective--MET  3.  Patient will perform straight leg raise and hold 10 seconds without quad lag on right lower extremity for increased quad control.--8 seconds--PROGRESSINg  4.  Patient will increase manual muscle test of right ankle/lower extremity to 5/5 for increased stability with gait and activiites of daily living.--See Objective--PROGRESSING     LONG TERM GOALS  1.  Patient will go  up/down stairs reciprocal pattern without handrails and good eccentric control on right lower extremity.--See Objective--MET  2.  Patient will ambulate independent on all surfaces without deviation and without pain in right ankle.--MET  3.  Patient will be able to climb on and off his truck and secure/unsecure loads without pain or difficulty to be able to return to work. --Unable--PROGRESSING    Plan     Plan of care Certification: 6/14/2024 to 7/12/2024     Outpatient Physical Therapy 2 times weekly for 4 weeks to include the following interventions: Electrical Stimulation IFC/premod as needed, Gait Training, Manual Therapy, Moist Heat/ Ice, Neuromuscular Re-ed, Patient Education, Therapeutic Activities, and Therapeutic Exercise    Braulio Moreland, PTA   05/30/2024

## 2024-06-03 NOTE — PLAN OF CARE
[facility]   Physical Therapy Updated Plan of Care      Name: Pablo Tabares  Clinic Number: [id number]     Therapy Diagnosis:        Encounter Diagnoses   Name Primary?    Status post ORIF of fracture of ankle Yes    Gait disturbance      Stiffness of right ankle joint      Swelling of joint, ankle, right        Physician: Ian Dorman MD     Visit Date: 5/28/2024     Physician Orders: PT Eval and Treat   Medical Diagnosis from Referral: see  Evaluation Date: 4/22/2024  Authorization Period Expiration: 7/21/2024   Plan of Care Expiration: 6/14/2024     Date of Surgery: 1/23/2024  Visit # / Visits authorized: 10/13    FOTO: 1/3 = 44              2/3 = 58     Precautions: Standard   PTA Visit #: 0/5     See daily note for today's physical therapy treatment.    Reasons for Recertification of Therapy: Patient has made excellent progress in therapy. He has made very good gains with strength and range of motion as documented below. His gait has become much less antalgic. He does not have to go up and down stairs sideways anymore. He still has a little difficulty trying to climb in and out of his truck. He is also still having an issue with swelling which causes pain. On evaluation he was having pain as high as 8/10 but now his pain is about 3-4/10 at worst. His eversion strength is still lacking and that is the motion that still causes the most pain. He would benefit from continued physical therapy to improve eversion strength and ankle stability for completing work related tasks and activiites of daily living safely.     Range of motion:  Motion Right - current Right - evaluation    Ankle DF +8 degrees   -2 degrees    Ankle PF   60 degrees   60 degrees    Ankle Inversion 25 degrees   10 degrees    Ankle Eversion 20 degrees   3 degrees       Manual muscle test   Muscle Right - current Right - evaluation    Ankle DF  MMT strength: 4+/5  MMT strength: 4/5   Ankle PF  MMT strength: 4+/5  MMT  strength: 4/5   Ankle inversion  MMT strength: 4/5  MMT strength: 3+/5   Ankle eversion  MMT strength: 4-/5  MMT strength: 3+/5       Goals:  SHORT TERM GOALS  1.  Patient to be independent with home exercise program to facilitate carryover between therapy visits. MET  2.  Patient will have 8-10 degrees of dorsiflexion, 10-15 degrees of eversion, and 20-25 degrees inversion range of motion right ankle for improved gait and mobility. MET  3.  Patient will perform straight leg raise and hold 10 seconds without quad lag on right lower extremity for increased quad control. MET  4.  Patient will increase manual muscle test of right ankle/lower extremity to 5/5 for increased stability with gait and activiites of daily living. ONGOING/IMPROVING     LONG TERM GOALS  1.  Patient will go up/down stairs reciprocal pattern without handrails and good eccentric control on right lower extremity. ONGOING/IMPROVING - Pablo does not have to go up and down stairs sideways anymore but still has a little difficulty descending on right lower extremity   2.  Patient will ambulate independent on all surfaces without deviation and without pain in right ankle. ONGOING/IMPROVING - patient only has pain when ankle rolls into eversion or he is having a good bit of swelling which continues to be a problem  3.  Patient will be able to climb on and off his truck and secure/unsecure loads without pain or difficulty to be able to return to work. ONGOING/IMPROVING    Plan     Updated Certification Period: 6/3/2024 to 7/5/2024  Recommended Treatment Plan: 2 times per week for 4 weeks: Gait Training, Manual Therapy, Moist Heat/ Ice, Neuromuscular Re-ed, Patient Education, Therapeutic Activities, and Therapeutic Exercise      CARLOS AL, PT  6/3/2024      I CERTIFY THE NEED FOR THESE SERVICES FURNISHED UNDER THIS PLAN OF TREATMENT AND WHILE UNDER MY CARE.    Physician's comments:      Physician's Signature:  ___________________________________________________

## 2024-06-04 ENCOUNTER — CLINICAL SUPPORT (OUTPATIENT)
Dept: REHABILITATION | Facility: HOSPITAL | Age: 49
End: 2024-06-04
Attending: ORTHOPAEDIC SURGERY

## 2024-06-04 DIAGNOSIS — R26.9 GAIT DISTURBANCE: ICD-10-CM

## 2024-06-04 DIAGNOSIS — Z87.81 STATUS POST ORIF OF FRACTURE OF ANKLE: Primary | ICD-10-CM

## 2024-06-04 DIAGNOSIS — Z98.890 STATUS POST ORIF OF FRACTURE OF ANKLE: Primary | ICD-10-CM

## 2024-06-04 DIAGNOSIS — M25.471 SWELLING OF JOINT, ANKLE, RIGHT: ICD-10-CM

## 2024-06-04 DIAGNOSIS — M25.671 STIFFNESS OF RIGHT ANKLE JOINT: ICD-10-CM

## 2024-06-04 PROCEDURE — 97530 THERAPEUTIC ACTIVITIES: CPT | Mod: CQ

## 2024-06-04 PROCEDURE — 97110 THERAPEUTIC EXERCISES: CPT | Mod: CQ

## 2024-06-04 PROCEDURE — 97112 NEUROMUSCULAR REEDUCATION: CPT | Mod: CQ

## 2024-06-04 NOTE — PROGRESS NOTES
[facility]   Physical Therapy Treatment Note      Name: Pablo Tabares  Clinic Number: [id number]    Therapy Diagnosis:   Encounter Diagnoses   Name Primary?    Status post ORIF of fracture of ankle Yes    Gait disturbance     Stiffness of right ankle joint     Swelling of joint, ankle, right      Physician: Ian Dorman MD    Visit Date: 6/4/2024    Physician Orders: PT Eval and Treat   Medical Diagnosis from Referral: see  Evaluation Date: 4/22/2024  Authorization Period Expiration: 7/21/2024   Plan of Care Expiration: 7/12/2024     Date of Surgery: 1/23/2024  Visit # / Visits authorized: 12/13    FOTO: 1/3 = 44   2/3 = 58     Precautions: Standard   PTA Visit #: 2/5     Time In: 1:45 pm  Time Out: 2:27 pm  Total Billable Time: 42 individual minutes     Subjective     Pt reports: no new complaints  He was compliant with home exercise program.  Response to previous treatment: no complaints   Functional change: no change noted    Pain: 0/10  Location: right ankle     Objective      Range of motion:  Motion Right Left    Ankle DF +10 degrees   +8 degrees    Ankle PF   60 degrees   WITHIN FUNCTIONAL LIMITS   Ankle Inversion 25 degrees   35 degrees    Ankle Eversion 20 degrees   18 degrees      Manual muscle test   Muscle Right  Left    Ankle DF  MMT strength: 4+/5  MMT strength: 5/5   Ankle PF  MMT strength: 5/5  MMT strength: 5/5   Ankle inversion  MMT strength: 4-/5  MMT strength: 5/5   Ankle eversion  MMT strength: 4-/5  MMT strength: 5/5         Gait:  Weight bearing precautions: WBAT  Assistive device: none  Ambulation distance and deviations: community distance without deviation  Stairs: Manages steps with reciprocal pattern and no handrails    Treatment     Pablo received the treatments listed below:      therapeutic exercises to develop strength, endurance, ROM, flexibility, posture, and core stabilization for 15 minutes including:  Bike x 5 minutes   Slant board--3 x 30 sec -  "Gastroc  Seated soleus raise--6 plates--3 x 30  Slant board soleus stretch--3 x 30 seconds  3 way calf raises x 30 each way    manual therapy techniques: Joint mobilizations, Manual traction, Myofacial release, Soft tissue Mobilization, and Friction Massage were applied to the: R ankle for 0 minutes, including:  -  (Not Today)  Jalil barnett    neuromuscular re-education activities to improve: Balance, Coordination, Kinesthetic Sense, Proprioception, and Posture for  19 minutes. The following activities were included:  Multiaxis ankle in eversion/inversion x 30 each way  Multiaxis ankle in plantarflexion/dorsiflexion x 30 each way  Heel walks--2 laps  SLS on pad--30 sec x 3  SLS on wobble board--20 sec x 4    therapeutic activities to improve functional performance for 8 minutes, including:  Unilateral calf raises on leg press - 8 plates 3 x 15  Calf raises on hack squat 70# x 30- unilateral   Lateral step downs 6" x 30    (Not today)  Unilateral leg press 7 plates x 30    Patient Education and Home Exercises       Education provided:   - review of home exercise program and current Plan of Care/rationale of treatment.    Written Home Exercises Provided: Patient instructed to cont prior HEP. Exercises were reviewed and Pablo was able to demonstrate them prior to the end of the session.  Pablo demonstrated good understanding of the education provided. See EMR under Patient Instructions for exercises provided during therapy sessions    Assessment     At Evaluation:  Pablo is a 48 y.o. male referred to outpatient Physical Therapy with a medical diagnosis of s/p ORIF right ankle. Patient presents with typical postop symptoms of marked swelling, decreased range of motion, decreased strength, gait disturbance, pain w/ ambulation, and inability to return to work yet. He is very limited with dorsiflexion/eversion/inversion and has the most pain with eversion/inversion. He ambulates with toe out gait due to decreased " dorsiflexion range of motion. Corrected how he was descending stairs today. 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     Current Assessment:  Pablo continues to work hard to reach functional goals. He is progressing slowly with eversion strength and ability to step up high for return to work as . He does well with most activities but remains challenged most with eversion. Will continue Plan of Care to address functional deficits.    Pablo Is progressing towards his goals.   Pt prognosis is Good.     Pt will continue to benefit from skilled outpatient physical therapy to address the deficits listed in the problem list box on initial evaluation, provide pt/family education and to maximize pt's level of independence in the home and community environment.     Pt's spiritual, cultural and educational needs considered and pt agreeable to plan of care and goals.     Anticipated barriers to physical therapy: none    Goals:  SHORT TERM GOALS  1.  Patient to be independent with home exercise program to facilitate carryover between therapy visits.--Reports Compliance--MET  2.  Patient will have 8-10 degrees of plantarflexion, 10-15 degrees of eversion, and 20-25 degrees inversion range of motion right ankle for improved gait and mobility.--See Objective--MET  3.  Patient will perform straight leg raise and hold 10 seconds without quad lag on right lower extremity for increased quad control.--8 seconds--PROGRESSINg  4.  Patient will increase manual muscle test of right ankle/lower extremity to 5/5 for increased stability with gait and activiites of daily living.--See Objective--PROGRESSING     LONG TERM GOALS  1.  Patient will go up/down stairs reciprocal pattern without handrails and good eccentric control on right lower extremity.--See Objective--MET  2.  Patient will ambulate independent on  all surfaces without deviation and without pain in right ankle.--MET  3.  Patient will be able to climb on and off his truck and secure/unsecure loads without pain or difficulty to be able to return to work. --Unable--PROGRESSING    Plan     Plan of care Certification: 6/14/2024 to 7/12/2024     Outpatient Physical Therapy 2 times weekly for 4 weeks to include the following interventions: Electrical Stimulation IFC/premod as needed, Gait Training, Manual Therapy, Moist Heat/ Ice, Neuromuscular Re-ed, Patient Education, Therapeutic Activities, and Therapeutic Exercise    Allyson Do, PTA   06/04/2024

## 2024-06-06 ENCOUNTER — CLINICAL SUPPORT (OUTPATIENT)
Dept: REHABILITATION | Facility: HOSPITAL | Age: 49
End: 2024-06-06

## 2024-06-06 DIAGNOSIS — M25.671 STIFFNESS OF RIGHT ANKLE JOINT: ICD-10-CM

## 2024-06-06 DIAGNOSIS — Z98.890 STATUS POST ORIF OF FRACTURE OF ANKLE: Primary | ICD-10-CM

## 2024-06-06 DIAGNOSIS — M25.471 SWELLING OF JOINT, ANKLE, RIGHT: ICD-10-CM

## 2024-06-06 DIAGNOSIS — R26.9 GAIT DISTURBANCE: ICD-10-CM

## 2024-06-06 DIAGNOSIS — Z87.81 STATUS POST ORIF OF FRACTURE OF ANKLE: Primary | ICD-10-CM

## 2024-06-06 PROCEDURE — 97110 THERAPEUTIC EXERCISES: CPT | Mod: CQ

## 2024-06-06 PROCEDURE — 97112 NEUROMUSCULAR REEDUCATION: CPT | Mod: CQ

## 2024-06-06 PROCEDURE — 97530 THERAPEUTIC ACTIVITIES: CPT | Mod: CQ

## 2024-06-06 NOTE — PROGRESS NOTES
[facility]   Physical Therapy Treatment Note      Name: Pablo Tabares  Clinic Number: [id number]    Therapy Diagnosis:   Encounter Diagnoses   Name Primary?    Status post ORIF of fracture of ankle Yes    Gait disturbance     Stiffness of right ankle joint     Swelling of joint, ankle, right      Physician: Ian Dorman MD    Visit Date: 6/6/2024    Physician Orders: PT Eval and Treat   Medical Diagnosis from Referral: see  Evaluation Date: 4/22/2024  Authorization Period Expiration: 7/21/2024   Plan of Care Expiration: 7/12/2024     Date of Surgery: 1/23/2024  Visit # / Visits authorized: 13/17   FOTO: 1/3 = 44   2/3 = 58     Precautions: Standard   PTA Visit #: 3/5     Time In: 1:00 pm  Time Out: 2:02 pm  Total Billable Time: 53 individual minutes (9 min non billed)    Subjective     Pt reports: no new complaints  He was compliant with home exercise program.  Response to previous treatment: no complaints   Functional change: no change noted    Pain: 0/10  Location: right ankle     Objective      Range of motion:  Motion Right Left    Ankle DF +10 degrees   +8 degrees    Ankle PF   60 degrees   WITHIN FUNCTIONAL LIMITS   Ankle Inversion 32 degrees   35 degrees    Ankle Eversion 20 degrees   18 degrees      Manual muscle test   Muscle Right  Left    Ankle DF  MMT strength: 4+/5  MMT strength: 5/5   Ankle PF  MMT strength: 5/5  MMT strength: 5/5   Ankle inversion  MMT strength: 4-/5  MMT strength: 5/5   Ankle eversion  MMT strength: 4-/5  MMT strength: 5/5         Gait:  Weight bearing precautions: WBAT  Assistive device: none  Ambulation distance and deviations: community distance without deviation  Stairs: Manages steps with reciprocal pattern and no handrails    Treatment     Pablo received the treatments listed below:      therapeutic exercises to develop strength, endurance, ROM, flexibility, posture, and core stabilization for 15 minutes including:  Bike x 5 minutes   Slant  board--3 x 30 sec - Gastroc  Seated soleus raise--6 plates--3 x 30  Slant board soleus stretch--3 x 30 seconds  Heel drop stretch--3 x 30 sec  Eccentric 2 to 1 calf raises--  3 way calf raises x 30 each way'    (Not Today)      manual therapy techniques: Joint mobilizations, Manual traction, Myofacial release, Soft tissue Mobilization, and Friction Massage were applied to the: R ankle for 0 minutes, including:  -  (Not Today)  Jalil barnett    neuromuscular re-education activities to improve: Balance, Coordination, Kinesthetic Sense, Proprioception, and Posture for  15 minutes. The following activities were included:  Multiaxis ankle in eversion/inversion x 30 each way  Multiaxis ankle in plantarflexion/dorsiflexion x 30 each way  Heel walks--2 laps  SLS on wobble board--30 sec x 3  SL rocking on wobble board--PF/DF/Inv/Ev--3 x 10    therapeutic activities to improve functional performance for 23 minutes, including:  Unilateral calf raises on leg press - 8 plates 3 x 15  Calf raises on hack squat 70# x 30- unilateral   Lateral step downs on slant board--Eversion and eversion--15 each way  High march cross overs--blue sports cord on L ankle--20 x     (Not today)  Unilateral leg press 7 plates x 30    Patient Education and Home Exercises       Education provided:   - review of home exercise program and current Plan of Care/rationale of treatment.    Written Home Exercises Provided: Patient instructed to cont prior HEP. Exercises were reviewed and Pablo was able to demonstrate them prior to the end of the session.  Pablo demonstrated good understanding of the education provided. See EMR under Patient Instructions for exercises provided during therapy sessions    Assessment     At Evaluation:  Pablo is a 48 y.o. male referred to outpatient Physical Therapy with a medical diagnosis of s/p ORIF right ankle. Patient presents with typical postop symptoms of marked swelling, decreased range of motion, decreased strength,  gait disturbance, pain w/ ambulation, and inability to return to work yet. He is very limited with dorsiflexion/eversion/inversion and has the most pain with eversion/inversion. He ambulates with toe out gait due to decreased dorsiflexion range of motion. Corrected how he was descending stairs today. 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     Current Assessment:  Pablo continues to work hard to reach functional goals. He is progressing slowly with eversion strength and ability to step up high for return to work as . He does well with most activities but remains challenged most with eversion. Did add some higher level eversion/inversion activities without issue, but very difficult.  Will continue Plan of Care to address functional deficits. 4 additional visits approved.      Pablo Is progressing towards his goals.   Pt prognosis is Good.     Pt will continue to benefit from skilled outpatient physical therapy to address the deficits listed in the problem list box on initial evaluation, provide pt/family education and to maximize pt's level of independence in the home and community environment.     Pt's spiritual, cultural and educational needs considered and pt agreeable to plan of care and goals.     Anticipated barriers to physical therapy: none    Goals:  SHORT TERM GOALS  1.  Patient to be independent with home exercise program to facilitate carryover between therapy visits.--Reports Compliance--MET  2.  Patient will have 8-10 degrees of plantarflexion, 10-15 degrees of eversion, and 20-25 degrees inversion range of motion right ankle for improved gait and mobility.--See Objective--MET  3.  Patient will perform straight leg raise and hold 10 seconds without quad lag on right lower extremity for increased quad control.--8 seconds--PROGRESSINg  4.  Patient will increase manual  muscle test of right ankle/lower extremity to 5/5 for increased stability with gait and activiites of daily living.--See Objective--PROGRESSING     LONG TERM GOALS  1.  Patient will go up/down stairs reciprocal pattern without handrails and good eccentric control on right lower extremity.--See Objective--MET  2.  Patient will ambulate independent on all surfaces without deviation and without pain in right ankle.--MET  3.  Patient will be able to climb on and off his truck and secure/unsecure loads without pain or difficulty to be able to return to work. --Unable--PROGRESSING    Plan     Plan of care Certification: 6/14/2024 to 7/12/2024     Outpatient Physical Therapy 2 times weekly for 4 weeks to include the following interventions: Electrical Stimulation IFC/premod as needed, Gait Training, Manual Therapy, Moist Heat/ Ice, Neuromuscular Re-ed, Patient Education, Therapeutic Activities, and Therapeutic Exercise    Braulio Moreland, PTA   06/06/2024

## 2024-06-11 ENCOUNTER — CLINICAL SUPPORT (OUTPATIENT)
Dept: REHABILITATION | Facility: HOSPITAL | Age: 49
End: 2024-06-11

## 2024-06-11 DIAGNOSIS — M25.671 STIFFNESS OF RIGHT ANKLE JOINT: ICD-10-CM

## 2024-06-11 DIAGNOSIS — Z87.81 STATUS POST ORIF OF FRACTURE OF ANKLE: Primary | ICD-10-CM

## 2024-06-11 DIAGNOSIS — M25.471 SWELLING OF JOINT, ANKLE, RIGHT: ICD-10-CM

## 2024-06-11 DIAGNOSIS — R26.9 GAIT DISTURBANCE: ICD-10-CM

## 2024-06-11 DIAGNOSIS — Z98.890 STATUS POST ORIF OF FRACTURE OF ANKLE: Primary | ICD-10-CM

## 2024-06-11 PROCEDURE — 97112 NEUROMUSCULAR REEDUCATION: CPT | Mod: CQ

## 2024-06-11 PROCEDURE — 97530 THERAPEUTIC ACTIVITIES: CPT | Mod: CQ

## 2024-06-11 NOTE — PROGRESS NOTES
[facility]   Physical Therapy Treatment Note      Name: Pablo Tabares  Clinic Number: [id number]    Therapy Diagnosis:   Encounter Diagnoses   Name Primary?    Status post ORIF of fracture of ankle Yes    Gait disturbance     Stiffness of right ankle joint     Swelling of joint, ankle, right      Physician: Ian Dorman MD    Visit Date: 6/11/2024    Physician Orders: PT Eval and Treat   Medical Diagnosis from Referral: see  Evaluation Date: 4/22/2024  Authorization Period Expiration: 7/21/2024   Plan of Care Expiration: 7/12/2024     Date of Surgery: 1/23/2024  Visit # / Visits authorized: 14/17   FOTO: 1/3 = 44   2/3 = 58     Precautions: Standard   PTA Visit #: 4/5     Time In: 6:55 am  Time Out: 7:45 am  Total Billable Time: 23 individual minutes (27 min non billed)    Subjective     Pt reports: no new complaints  He was compliant with home exercise program.  Response to previous treatment: no complaints   Functional change: no change noted    Pain: 0/10  Location: right ankle     Objective      Range of motion:  Motion Right Left    Ankle DF +10 degrees   +8 degrees    Ankle PF   60 degrees   WITHIN FUNCTIONAL LIMITS   Ankle Inversion 32 degrees   35 degrees    Ankle Eversion 20 degrees   18 degrees      Manual muscle test   Muscle Right  Left    Ankle DF  MMT strength: 4+/5  MMT strength: 5/5   Ankle PF  MMT strength: 5/5  MMT strength: 5/5   Ankle inversion  MMT strength: 4-/5  MMT strength: 5/5   Ankle eversion  MMT strength: 4-/5  MMT strength: 5/5         Gait:  Weight bearing precautions: WBAT  Assistive device: none  Ambulation distance and deviations: community distance without deviation  Stairs: Manages steps with reciprocal pattern and no handrails    Treatment     Pablo received the treatments listed below:      therapeutic exercises to develop strength, endurance, ROM, flexibility, posture, and core stabilization for 0 minutes including:  Bike x 5 minutes   Slant  board--3 x 30 sec - Gastroc  Seated soleus raise--6 plates--3 x 30  Slant board soleus stretch--3 x 30 seconds  Heel drop stretch--3 x 30 sec  SL calf raise on step with eccentric lowering--3 x 20  3 way calf raises x 30 each way'    (Not Today)  Eccentric 2 to 1 calf raises--    manual therapy techniques: Joint mobilizations, Manual traction, Myofacial release, Soft tissue Mobilization, and Friction Massage were applied to the: R ankle for 0 minutes, including:  -  (Not Today)  Jalil barnett    neuromuscular re-education activities to improve: Balance, Coordination, Kinesthetic Sense, Proprioception, and Posture for  15 minutes. The following activities were included:  Multiaxis ankle in eversion/inversion x 30 each way  Multiaxis ankle in plantarflexion/dorsiflexion x 30 each way  Heel walks--2 laps  SLS on blue pad with reaching out of CARLOZ  SL rocking on wobble board--PF/DF/Inv/Ev--3 x 10    therapeutic activities to improve functional performance for 8 minutes, including:  Lateral step downs on slant board--Eversion and eversion--15 each way  High march cross overs--blue sports cord on L ankle--20 x     (Not today)  Unilateral calf raises on leg press - 8 plates 3 x 15  Calf raises on hack squat 70# x 30- unilateral   Unilateral leg press 7 plates x 30    Patient Education and Home Exercises       Education provided:   - review of home exercise program and current Plan of Care/rationale of treatment.    Written Home Exercises Provided: Patient instructed to cont prior HEP. Exercises were reviewed and Pablo was able to demonstrate them prior to the end of the session.  Pablo demonstrated good understanding of the education provided. See EMR under Patient Instructions for exercises provided during therapy sessions    Assessment     At Evaluation:  Pablo is a 48 y.o. male referred to outpatient Physical Therapy with a medical diagnosis of s/p ORIF right ankle. Patient presents with typical postop symptoms of marked  swelling, decreased range of motion, decreased strength, gait disturbance, pain w/ ambulation, and inability to return to work yet. He is very limited with dorsiflexion/eversion/inversion and has the most pain with eversion/inversion. He ambulates with toe out gait due to decreased dorsiflexion range of motion. Corrected how he was descending stairs today. 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     Current Assessment:  Pablo continues to work hard to reach functional goals. He is progressing slowly with eversion strength and ability to step up high for return to work as . He does well with most activities but remains challenged most with eversion. Continued higher level eversion/inversion activities without issue, but very difficult.  Will continue Plan of Care to address functional deficits. Will DC at end of approved visits.      Pablo Is progressing towards his goals.   Pt prognosis is Good.     Pt will continue to benefit from skilled outpatient physical therapy to address the deficits listed in the problem list box on initial evaluation, provide pt/family education and to maximize pt's level of independence in the home and community environment.     Pt's spiritual, cultural and educational needs considered and pt agreeable to plan of care and goals.     Anticipated barriers to physical therapy: none    Goals:  SHORT TERM GOALS  1.  Patient to be independent with home exercise program to facilitate carryover between therapy visits.--Reports Compliance--MET  2.  Patient will have 8-10 degrees of plantarflexion, 10-15 degrees of eversion, and 20-25 degrees inversion range of motion right ankle for improved gait and mobility.--See Objective--MET  3.  Patient will perform straight leg raise and hold 10 seconds without quad lag on right lower extremity for increased quad  control.--8 seconds--PROGRESSINg  4.  Patient will increase manual muscle test of right ankle/lower extremity to 5/5 for increased stability with gait and activiites of daily living.--See Objective--PROGRESSING     LONG TERM GOALS  1.  Patient will go up/down stairs reciprocal pattern without handrails and good eccentric control on right lower extremity.--See Objective--MET  2.  Patient will ambulate independent on all surfaces without deviation and without pain in right ankle.--MET  3.  Patient will be able to climb on and off his truck and secure/unsecure loads without pain or difficulty to be able to return to work. --Unable--PROGRESSING    Plan     Plan of care Certification: 6/14/2024 to 7/12/2024     Outpatient Physical Therapy 2 times weekly for 4 weeks to include the following interventions: Electrical Stimulation IFC/premod as needed, Gait Training, Manual Therapy, Moist Heat/ Ice, Neuromuscular Re-ed, Patient Education, Therapeutic Activities, and Therapeutic Exercise    Braulio Moreland, PTA   06/11/2024

## 2024-06-14 ENCOUNTER — CLINICAL SUPPORT (OUTPATIENT)
Dept: REHABILITATION | Facility: HOSPITAL | Age: 49
End: 2024-06-14
Attending: ORTHOPAEDIC SURGERY

## 2024-06-14 DIAGNOSIS — M25.471 SWELLING OF JOINT, ANKLE, RIGHT: ICD-10-CM

## 2024-06-14 DIAGNOSIS — R26.9 GAIT DISTURBANCE: ICD-10-CM

## 2024-06-14 DIAGNOSIS — M25.671 STIFFNESS OF RIGHT ANKLE JOINT: ICD-10-CM

## 2024-06-14 DIAGNOSIS — Z98.890 STATUS POST ORIF OF FRACTURE OF ANKLE: Primary | ICD-10-CM

## 2024-06-14 DIAGNOSIS — Z87.81 STATUS POST ORIF OF FRACTURE OF ANKLE: Primary | ICD-10-CM

## 2024-06-14 PROCEDURE — 97530 THERAPEUTIC ACTIVITIES: CPT | Mod: CQ

## 2024-06-14 PROCEDURE — 97110 THERAPEUTIC EXERCISES: CPT | Mod: CQ

## 2024-06-14 PROCEDURE — 97112 NEUROMUSCULAR REEDUCATION: CPT | Mod: CQ

## 2024-06-14 NOTE — PROGRESS NOTES
[facility]   Physical Therapy Treatment Note      Name: Pablo Tabares  Clinic Number: [id number]    Therapy Diagnosis:   Encounter Diagnoses   Name Primary?    Status post ORIF of fracture of ankle Yes    Gait disturbance     Stiffness of right ankle joint     Swelling of joint, ankle, right      Physician: Ian Dorman MD    Visit Date: 6/14/2024    Physician Orders: PT Eval and Treat   Medical Diagnosis from Referral: see  Evaluation Date: 4/22/2024  Authorization Period Expiration: 7/21/2024   Plan of Care Expiration: 7/12/2024     Date of Surgery: 1/23/2024  Visit # / Visits authorized: 15/17--on financial assistance  FOTO: 1/3 = 44   2/3 = 58     Precautions: Standard   PTA Visit #: 5/5     Time In: 11:45 am  Time Out: 12:52 pm  Total Billable Time:  67 individual minutes    Subjective     Pt reports: no new complaints  He was compliant with home exercise program.  Response to previous treatment: no complaints   Functional change: no change noted    Pain: 0/10  Location: right ankle     Objective      Range of motion:  Motion Right Left    Ankle DF +10 degrees   +8 degrees    Ankle PF   60 degrees   WITHIN FUNCTIONAL LIMITS   Ankle Inversion 32 degrees   35 degrees    Ankle Eversion 20 degrees   18 degrees      Manual muscle test   Muscle Right  Left    Ankle DF  MMT strength: 4+/5  MMT strength: 5/5   Ankle PF  MMT strength: 5/5  MMT strength: 5/5   Ankle inversion  MMT strength: 4-/5  MMT strength: 5/5   Ankle eversion  MMT strength: 4-/5  MMT strength: 5/5         Gait:  Weight bearing precautions: WBAT  Assistive device: none  Ambulation distance and deviations: community distance without deviation  Stairs: Manages steps with reciprocal pattern and no handrails    Treatment     Pablo received the treatments listed below:      therapeutic exercises to develop strength, endurance, ROM, flexibility, posture, and core stabilization for 23 minutes including:  Bike x 5 minutes  "  Slant board--3 x 30 sec - Gastroc  Seated soleus raise--7 plates--3 x 30  Slant board soleus stretch--3 x 30 seconds  Heel drop stretch--3 x 30 sec  SL calf raise on step with eccentric lowering--3 x 20  3 way calf raises x 30 each way'--10# DB    (Not Today)  Eccentric 2 to 1 calf raises--    manual therapy techniques: Joint mobilizations, Manual traction, Myofacial release, Soft tissue Mobilization, and Friction Massage were applied to the: R ankle for 0 minutes, including:  -  (Not Today)  Jalil barnett    neuromuscular re-education activities to improve: Balance, Coordination, Kinesthetic Sense, Proprioception, and Posture for  29 minutes. The following activities were included:  Multiaxis ankle in eversion/inversion x 30 each way  Multiaxis ankle in plantarflexion/dorsiflexion x 30 each way  Heel walks--2 laps  SLS on blue pad with reaching out of CARLOZ  SL rocking on b5mediale board--PF/DF/Inv/Ev--3 x 10    therapeutic activities to improve functional performance for 15 minutes, including:  Lateral step downs on slant board--Eversion and eversion--15 each way  High march cross overs--blue sports cord on L ankle--20 x   12" step ups with band pulling ankle into eversion/inversion--10 each    (Not today)  Unilateral calf raises on leg press - 8 plates 3 x 15  Calf raises on hack squat 70# x 30- unilateral   Unilateral leg press 7 plates x 30    Patient Education and Home Exercises       Education provided:   - review of home exercise program and current Plan of Care/rationale of treatment.    Written Home Exercises Provided: Patient instructed to cont prior HEP. Exercises were reviewed and Pablo was able to demonstrate them prior to the end of the session.  Pablo demonstrated good understanding of the education provided. See EMR under Patient Instructions for exercises provided during therapy sessions    Assessment     At Evaluation:  Pablo is a 48 y.o. male referred to outpatient Physical Therapy with a medical " diagnosis of s/p ORIF right ankle. Patient presents with typical postop symptoms of marked swelling, decreased range of motion, decreased strength, gait disturbance, pain w/ ambulation, and inability to return to work yet. He is very limited with dorsiflexion/eversion/inversion and has the most pain with eversion/inversion. He ambulates with toe out gait due to decreased dorsiflexion range of motion. Corrected how he was descending stairs today. 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     Current Assessment:  Pablo continues to work hard to reach functional goals. He is progressing well with eversion strength and ability to step up high for return to work as . He does well with most activities but remains challenged most with eversion. Continued higher level eversion/inversion activities without issue, but very difficult.  Will continue Plan of Care to address functional deficits. Will DC at end of POC.      Palbo Is progressing towards his goals.   Pt prognosis is Good.     Pt will continue to benefit from skilled outpatient physical therapy to address the deficits listed in the problem list box on initial evaluation, provide pt/family education and to maximize pt's level of independence in the home and community environment.     Pt's spiritual, cultural and educational needs considered and pt agreeable to plan of care and goals.     Anticipated barriers to physical therapy: none    Goals:  SHORT TERM GOALS  1.  Patient to be independent with home exercise program to facilitate carryover between therapy visits.--Reports Compliance--MET  2.  Patient will have 8-10 degrees of plantarflexion, 10-15 degrees of eversion, and 20-25 degrees inversion range of motion right ankle for improved gait and mobility.--See Objective--MET  3.  Patient will perform straight leg raise and hold 10  seconds without quad lag on right lower extremity for increased quad control.--8 seconds--PROGRESSINg  4.  Patient will increase manual muscle test of right ankle/lower extremity to 5/5 for increased stability with gait and activiites of daily living.--See Objective--PROGRESSING     LONG TERM GOALS  1.  Patient will go up/down stairs reciprocal pattern without handrails and good eccentric control on right lower extremity.--See Objective--MET  2.  Patient will ambulate independent on all surfaces without deviation and without pain in right ankle.--MET  3.  Patient will be able to climb on and off his truck and secure/unsecure loads without pain or difficulty to be able to return to work. --Unable--PROGRESSING    Plan     Plan of care Certification: 6/14/2024 to 7/12/2024     Outpatient Physical Therapy 2 times weekly for 4 weeks to include the following interventions: Electrical Stimulation IFC/premod as needed, Gait Training, Manual Therapy, Moist Heat/ Ice, Neuromuscular Re-ed, Patient Education, Therapeutic Activities, and Therapeutic Exercise    Braulio Moreland, KIRSTEN   06/14/2024

## 2024-06-18 ENCOUNTER — CLINICAL SUPPORT (OUTPATIENT)
Dept: REHABILITATION | Facility: HOSPITAL | Age: 49
End: 2024-06-18

## 2024-06-18 DIAGNOSIS — Z87.81 STATUS POST ORIF OF FRACTURE OF ANKLE: Primary | ICD-10-CM

## 2024-06-18 DIAGNOSIS — M25.671 STIFFNESS OF RIGHT ANKLE JOINT: ICD-10-CM

## 2024-06-18 DIAGNOSIS — R26.9 GAIT DISTURBANCE: ICD-10-CM

## 2024-06-18 DIAGNOSIS — Z98.890 STATUS POST ORIF OF FRACTURE OF ANKLE: Primary | ICD-10-CM

## 2024-06-18 DIAGNOSIS — M25.471 SWELLING OF JOINT, ANKLE, RIGHT: ICD-10-CM

## 2024-06-18 PROCEDURE — 97110 THERAPEUTIC EXERCISES: CPT

## 2024-06-18 PROCEDURE — 97112 NEUROMUSCULAR REEDUCATION: CPT

## 2024-06-18 PROCEDURE — 97530 THERAPEUTIC ACTIVITIES: CPT

## 2024-06-18 NOTE — PROGRESS NOTES
[facility]   Physical Therapy Treatment Note      Name: Pablo Tabares  Clinic Number: [id number]    Therapy Diagnosis:   Encounter Diagnoses   Name Primary?    Status post ORIF of fracture of ankle Yes    Gait disturbance     Stiffness of right ankle joint     Swelling of joint, ankle, right      Physician: Ian Dorman MD    Visit Date: 6/18/2024    Physician Orders: PT Eval and Treat   Medical Diagnosis from Referral: see  Evaluation Date: 4/22/2024  Authorization Period Expiration: 7/21/2024   Plan of Care Expiration: 7/12/2024     Date of Surgery: 1/23/2024  Visit # / Visits authorized: 16/17--on financial assistance  FOTO: 1/3 = 44   2/3 = 58     Precautions: Standard   PTA Visit #: 0/5     Time In: 2:36 pm  Time Out: 3:29 pm  Total Billable Time:  53 individual minutes    Subjective     Pt reports: no new complaints  He was compliant with home exercise program.  Response to previous treatment: no complaints   Functional change: no change noted    Pain: 0/10  Location: right ankle     Objective      Range of motion:  Motion Right Left    Ankle DF +10 degrees   +8 degrees    Ankle PF   60 degrees   WITHIN FUNCTIONAL LIMITS   Ankle Inversion 32 degrees   35 degrees    Ankle Eversion 20 degrees   18 degrees      Manual muscle test   Muscle Right  Left    Ankle DF  MMT strength: 4+/5  MMT strength: 5/5   Ankle PF  MMT strength: 5/5  MMT strength: 5/5   Ankle inversion  MMT strength: 4-/5  MMT strength: 5/5   Ankle eversion  MMT strength: 4-/5  MMT strength: 5/5         Gait:  Weight bearing precautions: WBAT  Assistive device: none  Ambulation distance and deviations: community distance without deviation  Stairs: Manages steps with reciprocal pattern and no handrails    Treatment     Pablo received the treatments listed below:      therapeutic exercises to develop strength, endurance, ROM, flexibility, posture, and core stabilization for 17 minutes including:  Bike x 5 minutes  "  Slant board--3 x 30 sec - Gastroc  Seated soleus raise--7 plates--3 x 15  Slant board soleus stretch--3 x 30 seconds  Heel drop stretch--  SL calf raise on step with eccentric lowering--3 x 20  3 way calf raises - each way'--10# DB    (Not Today)  Eccentric 2 to 1 calf raises--    manual therapy techniques: Joint mobilizations, Manual traction, Myofacial release, Soft tissue Mobilization, and Friction Massage were applied to the: R ankle for 0 minutes, including:  -  (Not Today)  Jalil barnett    neuromuscular re-education activities to improve: Balance, Coordination, Kinesthetic Sense, Proprioception, and Posture for  18 minutes. The following activities were included:  Multiaxis ankle in eversion/inversion x 30 each way  Multiaxis ankle in plantarflexion/dorsiflexion x 30 each way  Heel walks--2 laps  SLS on blue pad with reaching out of CARLOZ  SL rocking on wobble board--PF/DF/Inv/Ev--3 x 10  Single leg stance on Bosu - flat - x 3 min total (5-10 sec intervals)    therapeutic activities to improve functional performance for 18 minutes, including:  Lateral step downs on slant board--Eversion and Inversion--15 each way  High march cross overs--blue sports cord on L ankle--20 x   12" step ups with gray band pulling ankle into eversion/inversion--10 each  Curtsy lunges 2 x 10 each side  Squats on slant board 3 x 10  High step ups - 20" box - 2 x 10 (w/ HHA)    (Not today)  Unilateral calf raises on leg press - 8 plates 3 x 15  Calf raises on hack squat 70# x 30- unilateral   Unilateral leg press 7 plates x 30    Patient Education and Home Exercises       Education provided:   - review of home exercise program and current Plan of Care/rationale of treatment.    Written Home Exercises Provided: Patient instructed to cont prior HEP. Exercises were reviewed and Pablo was able to demonstrate them prior to the end of the session.  Pablo demonstrated good understanding of the education provided. See EMR under Patient " Instructions for exercises provided during therapy sessions    Assessment     At Evaluation:  Pablo is a 48 y.o. male referred to outpatient Physical Therapy with a medical diagnosis of s/p ORIF right ankle. Patient presents with typical postop symptoms of marked swelling, decreased range of motion, decreased strength, gait disturbance, pain w/ ambulation, and inability to return to work yet. He is very limited with dorsiflexion/eversion/inversion and has the most pain with eversion/inversion. He ambulates with toe out gait due to decreased dorsiflexion range of motion. Corrected how he was descending stairs today. 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     Current Assessment:  Pablo continues to work hard to reach functional goals. He is progressing well with eversion strength and ability to step up high for return to work as . Added curtsy lunges both directions to continue to work on lateral movements of ankle.   Will continue Plan of Care to address functional deficits. Will DC at end of POC.      Pablo Is progressing towards his goals.   Pt prognosis is Good.     Pt will continue to benefit from skilled outpatient physical therapy to address the deficits listed in the problem list box on initial evaluation, provide pt/family education and to maximize pt's level of independence in the home and community environment.     Pt's spiritual, cultural and educational needs considered and pt agreeable to plan of care and goals.     Anticipated barriers to physical therapy: none    Goals:  SHORT TERM GOALS  1.  Patient to be independent with home exercise program to facilitate carryover between therapy visits.--Reports Compliance--MET  2.  Patient will have 8-10 degrees of plantarflexion, 10-15 degrees of eversion, and 20-25 degrees inversion range of motion right ankle for improved  gait and mobility.--See Objective--MET  3.  Patient will perform straight leg raise and hold 10 seconds without quad lag on right lower extremity for increased quad control.--8 seconds--PROGRESSINg  4.  Patient will increase manual muscle test of right ankle/lower extremity to 5/5 for increased stability with gait and activiites of daily living.--See Objective--PROGRESSING     LONG TERM GOALS  1.  Patient will go up/down stairs reciprocal pattern without handrails and good eccentric control on right lower extremity.--See Objective--MET  2.  Patient will ambulate independent on all surfaces without deviation and without pain in right ankle.--MET  3.  Patient will be able to climb on and off his truck and secure/unsecure loads without pain or difficulty to be able to return to work. --Unable--PROGRESSING    Plan     Plan of care Certification: 6/14/2024 to 7/12/2024     Outpatient Physical Therapy 2 times weekly for 4 weeks to include the following interventions: Electrical Stimulation IFC/premod as needed, Gait Training, Manual Therapy, Moist Heat/ Ice, Neuromuscular Re-ed, Patient Education, Therapeutic Activities, and Therapeutic Exercise    CARLOS AL, PT   06/18/2024

## 2024-06-20 ENCOUNTER — CLINICAL SUPPORT (OUTPATIENT)
Dept: REHABILITATION | Facility: HOSPITAL | Age: 49
End: 2024-06-20

## 2024-06-20 DIAGNOSIS — Z98.890 STATUS POST ORIF OF FRACTURE OF ANKLE: Primary | ICD-10-CM

## 2024-06-20 DIAGNOSIS — Z87.81 STATUS POST ORIF OF FRACTURE OF ANKLE: Primary | ICD-10-CM

## 2024-06-20 DIAGNOSIS — M25.471 SWELLING OF JOINT, ANKLE, RIGHT: ICD-10-CM

## 2024-06-20 DIAGNOSIS — M25.671 STIFFNESS OF RIGHT ANKLE JOINT: ICD-10-CM

## 2024-06-20 DIAGNOSIS — R26.9 GAIT DISTURBANCE: ICD-10-CM

## 2024-06-20 PROCEDURE — 97110 THERAPEUTIC EXERCISES: CPT | Mod: CQ

## 2024-06-20 PROCEDURE — 97530 THERAPEUTIC ACTIVITIES: CPT | Mod: CQ

## 2024-06-20 PROCEDURE — 97112 NEUROMUSCULAR REEDUCATION: CPT | Mod: CQ

## 2024-06-20 NOTE — PROGRESS NOTES
[facility]   Physical Therapy Treatment Note      Name: Pablo Tabares  Clinic Number: [id number]    Therapy Diagnosis:   Encounter Diagnoses   Name Primary?    Status post ORIF of fracture of ankle Yes    Gait disturbance     Stiffness of right ankle joint     Swelling of joint, ankle, right      Physician: Ian Dorman MD    Visit Date: 6/20/2024    Physician Orders: PT Eval and Treat   Medical Diagnosis from Referral: see  Evaluation Date: 4/22/2024  Authorization Period Expiration: 7/21/2024   Plan of Care Expiration: 7/12/2024     Date of Surgery: 1/23/2024  Visit # / Visits authorized: 17/17--on financial assistance  FOTO: 1/3 = 44   2/3 = 58     Precautions: Standard   PTA Visit #: 1/5     Time In: 1:00 pm  Time Out: 1:56 pm  Total Billable Time:  56 individual minutes    Subjective     Pt reports: no new complaints  He was compliant with home exercise program.  Response to previous treatment: no complaints   Functional change: no change noted    Pain: 0/10  Location: right ankle     Objective      Range of motion:  Motion Right Left    Ankle DF +10 degrees   +8 degrees    Ankle PF   60 degrees   WITHIN FUNCTIONAL LIMITS   Ankle Inversion 32 degrees   35 degrees    Ankle Eversion 20 degrees   18 degrees      Manual muscle test   Muscle Right  Left    Ankle DF  MMT strength: 4+/5  MMT strength: 5/5   Ankle PF  MMT strength: 5/5  MMT strength: 5/5   Ankle inversion  MMT strength: 4-/5  MMT strength: 5/5   Ankle eversion  MMT strength: 4-/5  MMT strength: 5/5         Gait:  Weight bearing precautions: WBAT  Assistive device: none  Ambulation distance and deviations: community distance without deviation  Stairs: Manages steps with reciprocal pattern and no handrails    Treatment     Pablo received the treatments listed below:      therapeutic exercises to develop strength, endurance, ROM, flexibility, posture, and core stabilization for 23 minutes including:  Bike x 5 minutes  "  Slant board--3 x 30 sec - Gastroc  Seated soleus raise--7 plates--3 x 15  Slant board soleus stretch--3 x 30 seconds  Heel drop stretch--  SL calf raise on step with eccentric lowering--3 x 20  3 way calf raises - each way'--10# DB    (Not Today)  Eccentric 2 to 1 calf raises--    manual therapy techniques: Joint mobilizations, Manual traction, Myofacial release, Soft tissue Mobilization, and Friction Massage were applied to the: R ankle for 0 minutes, including:  -  (Not Today)  Jalil barnett    neuromuscular re-education activities to improve: Balance, Coordination, Kinesthetic Sense, Proprioception, and Posture for  23 minutes. The following activities were included:  Multiaxis ankle in eversion/inversion x 30 each way  Multiaxis ankle in plantarflexion/dorsiflexion x 30 each way  Heel walks--2 laps  SLS on blue pad with reaching out of CARLOZ  SL rocking on wobble board--PF/DF/Inv/Ev--3 x 10  Single leg stance on Bosu - flat - x 3 min total (5-10 sec intervals)    therapeutic activities to improve functional performance for 10 minutes, including:  Lateral step downs on slant board--Eversion and Inversion--15 each way  High march cross overs--blue sports cord on L ankle--20 x   12" step ups with gray band pulling ankle into eversion/inversion--10 each  Curtsy lunges 2 x 10 each side  Squats on slant board 3 x 10  High step ups - 20" box - 2 x 10 (w/ HHA)    (Not today)  Unilateral calf raises on leg press - 8 plates 3 x 15  Calf raises on hack squat 70# x 30- unilateral   Unilateral leg press 7 plates x 30    Patient Education and Home Exercises       Education provided:   - review of home exercise program and current Plan of Care/rationale of treatment.    Written Home Exercises Provided: Patient instructed to cont prior HEP. Exercises were reviewed and Pablo was able to demonstrate them prior to the end of the session.  Pablo demonstrated good understanding of the education provided. See EMR under Patient " Instructions for exercises provided during therapy sessions    Assessment     At Evaluation:  Pablo is a 48 y.o. male referred to outpatient Physical Therapy with a medical diagnosis of s/p ORIF right ankle. Patient presents with typical postop symptoms of marked swelling, decreased range of motion, decreased strength, gait disturbance, pain w/ ambulation, and inability to return to work yet. He is very limited with dorsiflexion/eversion/inversion and has the most pain with eversion/inversion. He ambulates with toe out gait due to decreased dorsiflexion range of motion. Corrected how he was descending stairs today. 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     Current Assessment:  Pablo continues to work hard to reach functional goals. He is progressing well with eversion strength and ability to step up high for return to work as . Added curtsy lunges both directions to continue to work on lateral movements of ankle.  Will continue Plan of Care to address functional deficits. Will DC at end of POC.      Pablo Is progressing towards his goals.   Pt prognosis is Good.     Pt will continue to benefit from skilled outpatient physical therapy to address the deficits listed in the problem list box on initial evaluation, provide pt/family education and to maximize pt's level of independence in the home and community environment.     Pt's spiritual, cultural and educational needs considered and pt agreeable to plan of care and goals.     Anticipated barriers to physical therapy: none    Goals:  SHORT TERM GOALS  1.  Patient to be independent with home exercise program to facilitate carryover between therapy visits.--Reports Compliance--MET  2.  Patient will have 8-10 degrees of plantarflexion, 10-15 degrees of eversion, and 20-25 degrees inversion range of motion right ankle for improved  gait and mobility.--See Objective--MET  3.  Patient will perform straight leg raise and hold 10 seconds without quad lag on right lower extremity for increased quad control.--8 seconds--PROGRESSINg  4.  Patient will increase manual muscle test of right ankle/lower extremity to 5/5 for increased stability with gait and activiites of daily living.--See Objective--PROGRESSING     LONG TERM GOALS  1.  Patient will go up/down stairs reciprocal pattern without handrails and good eccentric control on right lower extremity.--See Objective--MET  2.  Patient will ambulate independent on all surfaces without deviation and without pain in right ankle.--MET  3.  Patient will be able to climb on and off his truck and secure/unsecure loads without pain or difficulty to be able to return to work. --Unable--PROGRESSING    Plan     Plan of care Certification: 6/14/2024 to 7/12/2024     Outpatient Physical Therapy 2 times weekly for 4 weeks to include the following interventions: Electrical Stimulation IFC/premod as needed, Gait Training, Manual Therapy, Moist Heat/ Ice, Neuromuscular Re-ed, Patient Education, Therapeutic Activities, and Therapeutic Exercise    Braulio Moreland, KIRSTEN   06/20/2024

## 2024-06-25 ENCOUNTER — CLINICAL SUPPORT (OUTPATIENT)
Dept: REHABILITATION | Facility: HOSPITAL | Age: 49
End: 2024-06-25

## 2024-06-25 DIAGNOSIS — R26.9 GAIT DISTURBANCE: ICD-10-CM

## 2024-06-25 DIAGNOSIS — Z98.890 STATUS POST ORIF OF FRACTURE OF ANKLE: Primary | ICD-10-CM

## 2024-06-25 DIAGNOSIS — Z87.81 STATUS POST ORIF OF FRACTURE OF ANKLE: Primary | ICD-10-CM

## 2024-06-25 DIAGNOSIS — M25.471 SWELLING OF JOINT, ANKLE, RIGHT: ICD-10-CM

## 2024-06-25 DIAGNOSIS — M25.671 STIFFNESS OF RIGHT ANKLE JOINT: ICD-10-CM

## 2024-06-25 PROCEDURE — 97110 THERAPEUTIC EXERCISES: CPT | Mod: CQ

## 2024-06-25 PROCEDURE — 97112 NEUROMUSCULAR REEDUCATION: CPT | Mod: CQ

## 2024-06-25 PROCEDURE — 97530 THERAPEUTIC ACTIVITIES: CPT | Mod: CQ

## 2024-06-25 NOTE — PROGRESS NOTES
[facility]   Physical Therapy Treatment Note      Name: Pablo Tabares  Clinic Number: [id number]    Therapy Diagnosis:   Encounter Diagnoses   Name Primary?    Status post ORIF of fracture of ankle Yes    Gait disturbance     Stiffness of right ankle joint     Swelling of joint, ankle, right      Physician: Ian Dorman MD    Visit Date: 6/25/2024    Physician Orders: PT Eval and Treat   Medical Diagnosis from Referral: see  Evaluation Date: 4/22/2024  Authorization Period Expiration: 7/21/2024   Plan of Care Expiration: 7/12/2024     Date of Surgery: 1/23/2024  Visit # / Visits authorized: 17/17--on financial assistance  FOTO: 1/3 = 44   2/3 = 58     Precautions: Standard   PTA Visit #: 2/5     Time In: 2:30 pm  Time Out: 3:28 pm  Total Billable Time:  58 individual minutes    Subjective     Pt reports: Reports some soreness on arrival  He was compliant with home exercise program.  Response to previous treatment: no complaints   Functional change: no change noted    Pain: 0/10  Location: right ankle     Objective      Range of motion:  Motion Right Left    Ankle DF +10 degrees   +8 degrees    Ankle PF   60 degrees   WITHIN FUNCTIONAL LIMITS   Ankle Inversion 32 degrees   35 degrees    Ankle Eversion 20 degrees   18 degrees      Manual muscle test   Muscle Right  Left    Ankle DF  MMT strength: 4+/5  MMT strength: 5/5   Ankle PF  MMT strength: 5/5  MMT strength: 5/5   Ankle inversion  MMT strength: 4-/5  MMT strength: 5/5   Ankle eversion  MMT strength: 4-/5  MMT strength: 5/5         Gait:  Weight bearing precautions: WBAT  Assistive device: none  Ambulation distance and deviations: community distance without deviation  Stairs: Manages steps with reciprocal pattern and no handrails    Treatment     Pablo received the treatments listed below:      therapeutic exercises to develop strength, endurance, ROM, flexibility, posture, and core stabilization for 23 minutes including:  Bike x  "5 minutes   Slant board--3 x 30 sec - Gastroc  Seated soleus raise--7 plates--3 x 15  Slant board soleus stretch--3 x 30 seconds  Heel drop stretch--3 x 30 sec  SL calf raise on step with eccentric lowering--3 x 20  3 way calf raises - each way'--10# DB  Anterior ankle stretch--10 x 10 sec    (Not Today)  Eccentric 2 to 1 calf raises--    manual therapy techniques: Joint mobilizations, Manual traction, Myofacial release, Soft tissue Mobilization, and Friction Massage were applied to the: R ankle for 0 minutes, including:  -  (Not Today)  Jalil barnett    neuromuscular re-education activities to improve: Balance, Coordination, Kinesthetic Sense, Proprioception, and Posture for  23 minutes. The following activities were included:  Multiaxis ankle in eversion/inversion x 30 each way  Multiaxis ankle in plantarflexion/dorsiflexion x 30 each way  Heel walks--2 laps  SLS on blue pad with reaching out of CARLOZ  SL rocking on wobble board--PF/DF/Inv/Ev--3 x 10  Single leg stance on Bosu - flat - x 3 min total (5-10 sec intervals)    therapeutic activities to improve functional performance for 12 minutes, including:  Lateral step downs on slant board--Eversion and Inversion--15 each way  Curtsy lunges 2 x 10 each side with black cord  Squats on slant board 3 x 10  High step ups - 20" box - 2 x 10 (w/ HHA)    (Not today)  12" step ups with gray band pulling ankle into eversion/inversion--10 each  High march cross overs--blue sports cord on L ankle--20 x   Unilateral calf raises on leg press - 8 plates 3 x 15  Calf raises on hack squat 70# x 30- unilateral   Unilateral leg press 7 plates x 30    Patient Education and Home Exercises       Education provided:   - review of home exercise program and current Plan of Care/rationale of treatment.    Written Home Exercises Provided: Patient instructed to cont prior HEP. Exercises were reviewed and Pablo was able to demonstrate them prior to the end of the session.  Pablo demonstrated " good understanding of the education provided. See EMR under Patient Instructions for exercises provided during therapy sessions    Assessment     At Evaluation:  Pablo is a 48 y.o. male referred to outpatient Physical Therapy with a medical diagnosis of s/p ORIF right ankle. Patient presents with typical postop symptoms of marked swelling, decreased range of motion, decreased strength, gait disturbance, pain w/ ambulation, and inability to return to work yet. He is very limited with dorsiflexion/eversion/inversion and has the most pain with eversion/inversion. He ambulates with toe out gait due to decreased dorsiflexion range of motion. Corrected how he was descending stairs today. 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     Current Assessment:  Arrived with increased anterior ankle soreness.  Pablo continues to work hard to reach functional goals. He is progressing well with eversion strength and ability to step up high for return to work as . Added curtsy lunges both directions to continue to work on lateral movements of ankle.  Will continue Plan of Care to address functional deficits. Will DC at end of POC.      Pablo Is progressing towards his goals.   Pt prognosis is Good.     Pt will continue to benefit from skilled outpatient physical therapy to address the deficits listed in the problem list box on initial evaluation, provide pt/family education and to maximize pt's level of independence in the home and community environment.     Pt's spiritual, cultural and educational needs considered and pt agreeable to plan of care and goals.     Anticipated barriers to physical therapy: none    Goals:  SHORT TERM GOALS  1.  Patient to be independent with home exercise program to facilitate carryover between therapy visits.--Reports Compliance--MET  2.  Patient will have 8-10 degrees  of plantarflexion, 10-15 degrees of eversion, and 20-25 degrees inversion range of motion right ankle for improved gait and mobility.--See Objective--MET  3.  Patient will perform straight leg raise and hold 10 seconds without quad lag on right lower extremity for increased quad control.--8 seconds--PROGRESSINg  4.  Patient will increase manual muscle test of right ankle/lower extremity to 5/5 for increased stability with gait and activiites of daily living.--See Objective--PROGRESSING     LONG TERM GOALS  1.  Patient will go up/down stairs reciprocal pattern without handrails and good eccentric control on right lower extremity.--See Objective--MET  2.  Patient will ambulate independent on all surfaces without deviation and without pain in right ankle.--MET  3.  Patient will be able to climb on and off his truck and secure/unsecure loads without pain or difficulty to be able to return to work. --Unable--PROGRESSING    Plan     Plan of care Certification: 6/14/2024 to 7/12/2024     Outpatient Physical Therapy 2 times weekly for 4 weeks to include the following interventions: Electrical Stimulation IFC/premod as needed, Gait Training, Manual Therapy, Moist Heat/ Ice, Neuromuscular Re-ed, Patient Education, Therapeutic Activities, and Therapeutic Exercise    Braulio Moreland, KIRSTEN   06/25/2024

## 2024-06-27 ENCOUNTER — CLINICAL SUPPORT (OUTPATIENT)
Dept: REHABILITATION | Facility: HOSPITAL | Age: 49
End: 2024-06-27

## 2024-06-27 DIAGNOSIS — R26.9 GAIT DISTURBANCE: ICD-10-CM

## 2024-06-27 DIAGNOSIS — Z87.81 STATUS POST ORIF OF FRACTURE OF ANKLE: Primary | ICD-10-CM

## 2024-06-27 DIAGNOSIS — M25.471 SWELLING OF JOINT, ANKLE, RIGHT: ICD-10-CM

## 2024-06-27 DIAGNOSIS — M25.671 STIFFNESS OF RIGHT ANKLE JOINT: ICD-10-CM

## 2024-06-27 DIAGNOSIS — Z98.890 STATUS POST ORIF OF FRACTURE OF ANKLE: Primary | ICD-10-CM

## 2024-06-27 PROCEDURE — 97110 THERAPEUTIC EXERCISES: CPT | Mod: CQ

## 2024-06-27 PROCEDURE — 97530 THERAPEUTIC ACTIVITIES: CPT | Mod: CQ

## 2024-06-27 PROCEDURE — 97112 NEUROMUSCULAR REEDUCATION: CPT | Mod: CQ

## 2024-06-27 NOTE — PROGRESS NOTES
[facility]   Physical Therapy Treatment Note      Name: Pablo Tabares  Clinic Number: [id number]    Therapy Diagnosis:   No diagnosis found.    Physician: Ian Dorman MD    Visit Date: 6/27/2024    Physician Orders: PT Eval and Treat   Medical Diagnosis from Referral: see  Evaluation Date: 4/22/2024  Authorization Period Expiration: 7/21/2024   Plan of Care Expiration: 7/12/2024     Date of Surgery: 1/23/2024  Visit # / Visits authorized: 19--on financial assistance  FOTO: 1/3 = 44   2/3 = 58    3/3 = 65 (visit 19--6/27/24)    Precautions: Standard   PTA Visit #: 3/5     Time In: 3:27 pm  Time Out:  4:29 pm  Total Billable Time:   62 individual minutes    Subjective     Pt reports: Reports some soreness on arrival  He was compliant with home exercise program.  Response to previous treatment: no complaints   Functional change: no change noted    Pain: 0/10  Location: right ankle     Objective      Range of motion:  Motion Right Left    Ankle DF +10 degrees   +8 degrees    Ankle PF   60 degrees   WITHIN FUNCTIONAL LIMITS   Ankle Inversion 32 degrees   35 degrees    Ankle Eversion 20 degrees   18 degrees      Manual muscle test   Muscle Right  Left    Ankle DF  MMT strength: 4+/5  MMT strength: 5/5   Ankle PF  MMT strength: 5/5  MMT strength: 5/5   Ankle inversion  MMT strength: 4-/5  MMT strength: 5/5   Ankle eversion  MMT strength: 4-/5  MMT strength: 5/5         Gait:  Weight bearing precautions: WBAT  Assistive device: none  Ambulation distance and deviations: community distance without deviation  Stairs: Manages steps with reciprocal pattern and no handrails    Treatment     Pablo received the treatments listed below:      therapeutic exercises to develop strength, endurance, ROM, flexibility, posture, and core stabilization for 27 minutes including:  Bike x 5 minutes   Slant board--3 x 30 sec - Gastroc  Seated soleus raise--7 plates--3 x 15  Slant board soleus stretch--3 x 30  "seconds  Heel drop stretch--3 x 30 sec  SL calf raise on step with eccentric lowering--3 x 20  3 way calf raises - each way'--10# DB  Anterior ankle stretch--10 x 10 sec    (Not Today)  Eccentric 2 to 1 calf raises--    manual therapy techniques: Joint mobilizations, Manual traction, Myofacial release, Soft tissue Mobilization, and Friction Massage were applied to the: R ankle for 0 minutes, including:  -  (Not Today)  Jalil barnett    neuromuscular re-education activities to improve: Balance, Coordination, Kinesthetic Sense, Proprioception, and Posture for  23 minutes. The following activities were included:  Heel walks--2 laps  Toe walks--2 laps  SL rocking on wobble board--PF/DF/Inv/Ev--3 x 10  TRX split squats with sol raise--3 x 10    (Not Today)  Single leg stance on Bosu - flat - x 3 min total (5-10 sec intervals)  Multiaxis ankle in eversion/inversion x 30 each way  Multiaxis ankle in plantarflexion/dorsiflexion x 30 each way  SLS on blue pad with reaching out of CARLOZ    therapeutic activities to improve functional performance for 12 minutes, including:  Lateral step downs on slant board--Eversion and Inversion--15 each way  Curtsy lunges 2 x 10 each side with black cord  High step ups - 20" box - 2 x 10 (w/ HHA)    (Not today)  Squats on slant board 3 x 10  12" step ups with gray band pulling ankle into eversion/inversion--10 each  High march cross overs--blue sports cord on L ankle--20 x   Unilateral calf raises on leg press - 8 plates 3 x 15  Calf raises on hack squat 70# x 30- unilateral   Unilateral leg press 7 plates x 30    Patient Education and Home Exercises       Education provided:   - review of home exercise program and current Plan of Care/rationale of treatment.    Written Home Exercises Provided: Patient instructed to cont prior HEP. Exercises were reviewed and Pablo was able to demonstrate them prior to the end of the session.  Pablo demonstrated good understanding of the education provided. " See EMR under Patient Instructions for exercises provided during therapy sessions    Assessment     At Evaluation:  Pablo is a 48 y.o. male referred to outpatient Physical Therapy with a medical diagnosis of s/p ORIF right ankle. Patient presents with typical postop symptoms of marked swelling, decreased range of motion, decreased strength, gait disturbance, pain w/ ambulation, and inability to return to work yet. He is very limited with dorsiflexion/eversion/inversion and has the most pain with eversion/inversion. He ambulates with toe out gait due to decreased dorsiflexion range of motion. Corrected how he was descending stairs today. 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     Current Assessment:  Arrived with a decrease in anterior ankle soreness.  Reports some scar tightness.  Pablo continues to work hard to reach functional goals. He is progressing well with eversion strength and ability to step up high for return to work as . Added Split Squats with Soleus raise without issue.   Will continue Plan of Care to address functional deficits. Will DC at end of POC.      Pablo Is progressing towards his goals.   Pt prognosis is Good.     Pt will continue to benefit from skilled outpatient physical therapy to address the deficits listed in the problem list box on initial evaluation, provide pt/family education and to maximize pt's level of independence in the home and community environment.     Pt's spiritual, cultural and educational needs considered and pt agreeable to plan of care and goals.     Anticipated barriers to physical therapy: none    Goals:  SHORT TERM GOALS  1.  Patient to be independent with home exercise program to facilitate carryover between therapy visits.--Reports Compliance--MET  2.  Patient will have 8-10 degrees of plantarflexion, 10-15 degrees of eversion,  and 20-25 degrees inversion range of motion right ankle for improved gait and mobility.--See Objective--MET  3.  Patient will perform straight leg raise and hold 10 seconds without quad lag on right lower extremity for increased quad control.--8 seconds--PROGRESSINg  4.  Patient will increase manual muscle test of right ankle/lower extremity to 5/5 for increased stability with gait and activiites of daily living.--See Objective--PROGRESSING     LONG TERM GOALS  1.  Patient will go up/down stairs reciprocal pattern without handrails and good eccentric control on right lower extremity.--See Objective--MET  2.  Patient will ambulate independent on all surfaces without deviation and without pain in right ankle.--MET  3.  Patient will be able to climb on and off his truck and secure/unsecure loads without pain or difficulty to be able to return to work. --Unable--PROGRESSING    Plan     Plan of care Certification: 6/14/2024 to 7/12/2024     Outpatient Physical Therapy 2 times weekly for 4 weeks to include the following interventions: Electrical Stimulation IFC/premod as needed, Gait Training, Manual Therapy, Moist Heat/ Ice, Neuromuscular Re-ed, Patient Education, Therapeutic Activities, and Therapeutic Exercise    Braulio Moreland, PTA   06/27/2024

## 2024-07-02 ENCOUNTER — CLINICAL SUPPORT (OUTPATIENT)
Dept: REHABILITATION | Facility: HOSPITAL | Age: 49
End: 2024-07-02

## 2024-07-02 DIAGNOSIS — M25.671 STIFFNESS OF RIGHT ANKLE JOINT: ICD-10-CM

## 2024-07-02 DIAGNOSIS — Z98.890 STATUS POST ORIF OF FRACTURE OF ANKLE: Primary | ICD-10-CM

## 2024-07-02 DIAGNOSIS — R26.9 GAIT DISTURBANCE: ICD-10-CM

## 2024-07-02 DIAGNOSIS — M25.471 SWELLING OF JOINT, ANKLE, RIGHT: ICD-10-CM

## 2024-07-02 DIAGNOSIS — Z87.81 STATUS POST ORIF OF FRACTURE OF ANKLE: Primary | ICD-10-CM

## 2024-07-02 PROCEDURE — 97530 THERAPEUTIC ACTIVITIES: CPT

## 2024-07-02 PROCEDURE — 97110 THERAPEUTIC EXERCISES: CPT

## 2024-07-02 PROCEDURE — 97112 NEUROMUSCULAR REEDUCATION: CPT

## 2024-07-02 NOTE — PROGRESS NOTES
[facility]   Physical Therapy Treatment Note      Name: Pablo Tabares  Clinic Number: [id number]    Therapy Diagnosis:   Encounter Diagnoses   Name Primary?    Status post ORIF of fracture of ankle Yes    Gait disturbance     Stiffness of right ankle joint     Swelling of joint, ankle, right        Physician: Ian Dorman MD    Visit Date: 7/2/2024    Physician Orders: PT Eval and Treat   Medical Diagnosis from Referral: see  Evaluation Date: 4/22/2024  Authorization Period Expiration: 7/21/2024   Plan of Care Expiration: 7/12/2024     Date of Surgery: 1/23/2024  Visit # / Visits authorized: 20--on financial assistance  FOTO: 1/3 = 44   2/3 = 58    3/3 = 65 (visit 19--6/27/24)    Precautions: Standard   PTA Visit #: 0/5     Time In: 1:01 pm  Time Out:  1:49 pm  Total Billable Time:   48 individual minutes    Subjective     Pt reports: no complaints on arrival  He was compliant with home exercise program.  Response to previous treatment: no complaints   Functional change: no change noted    Pain: 0/10  Location: right ankle     Objective      Range of motion:  Motion Right Left    Ankle DF +10 degrees   +8 degrees    Ankle PF   60 degrees   WITHIN FUNCTIONAL LIMITS   Ankle Inversion 32 degrees   35 degrees    Ankle Eversion 20 degrees   18 degrees      Manual muscle test   Muscle Right  Left    Ankle DF  MMT strength: 4+/5  MMT strength: 5/5   Ankle PF  MMT strength: 5/5  MMT strength: 5/5   Ankle inversion  MMT strength: 4-/5  MMT strength: 5/5   Ankle eversion  MMT strength: 4-/5  MMT strength: 5/5         Gait:  Weight bearing precautions: WBAT  Assistive device: none  Ambulation distance and deviations: community distance without deviation  Stairs: Manages steps with reciprocal pattern and no handrails    Treatment     Pablo received the treatments listed below:      therapeutic exercises to develop strength, endurance, ROM, flexibility, posture, and core stabilization for 27  "minutes including:  Bike x 5 minutes   Slant board-- x 30 sec - Gastroc  Seated soleus raise--7 plates--3 x 15  Slant board soleus stretch-- x 30 seconds  Heel drop stretch--  SL calf raise against wall-3 x 10  3 way calf raises - x 15 each way--15# DB  Anterior ankle stretch--4 x 20 sec  Pro stretch 5 x 10sh each way    (Not Today)  Eccentric 2 to 1 calf raises--    manual therapy techniques: Joint mobilizations, Manual traction, Myofacial release, Soft tissue Mobilization, and Friction Massage were applied to the: R ankle for 0 minutes, including:  -  (Not Today)  Jalil barnett    neuromuscular re-education activities to improve: Balance, Coordination, Kinesthetic Sense, Proprioception, and Posture for  15 minutes. The following activities were included:  Heel walks--2 laps  Toe walks--2 laps  SL rocking on Telerabble board--PF/DF/Inv/Ev--  TRX split squats with sol raise--3 x 10    (Not Today)  Single leg stance on Bosu - flat - x 3 min total (5-10 sec intervals)  Multiaxis ankle in eversion/inversion x 30 each way  Multiaxis ankle in plantarflexion/dorsiflexion x 30 each way  SLS on blue pad with reaching out of CARLOZ    therapeutic activities to improve functional performance for 16 minutes, including:  Lateral step downs on slant board--Eversion and Inversion--2 x 10 each way  Curtsy lunges 2 x 10 each side with black cord  High step ups - 20" box - 3 x 10     (Not today)  Squats on slant board 3 x 10  12" step ups with gray band pulling ankle into eversion/inversion--10 each  High march cross overs--blue sports cord on L ankle--20 x   Unilateral calf raises on leg press - 8 plates 3 x 15  Calf raises on hack squat 70# x 30- unilateral   Unilateral leg press 7 plates x 30    Patient Education and Home Exercises       Education provided:   - review of home exercise program and current Plan of Care/rationale of treatment.    Written Home Exercises Provided: Patient instructed to cont prior HEP. Exercises were reviewed " "and Pablo was able to demonstrate them prior to the end of the session.  Pablo demonstrated good understanding of the education provided. See EMR under Patient Instructions for exercises provided during therapy sessions    Assessment     At Evaluation:  Pablo is a 48 y.o. male referred to outpatient Physical Therapy with a medical diagnosis of s/p ORIF right ankle. Patient presents with typical postop symptoms of marked swelling, decreased range of motion, decreased strength, gait disturbance, pain w/ ambulation, and inability to return to work yet. He is very limited with dorsiflexion/eversion/inversion and has the most pain with eversion/inversion. He ambulates with toe out gait due to decreased dorsiflexion range of motion. Corrected how he was descending stairs today. 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     Current Assessment:  Arrived with a decrease in anterior ankle soreness.  Able to do 20" step ups without hand held assist today. Reports some scar tightness. Pablo continues to work hard to reach functional goals. He is progressing well with eversion strength and ability to step up high for return to work as . Added single leg calf raise against the wall. Also added pro-stretch to improve dorsiflexion and plantarflexion range of motion.  Will continue Plan of Care to address functional deficits. Will DC at end of POC.      Pablo Is progressing towards his goals.   Pt prognosis is Good.     Pt will continue to benefit from skilled outpatient physical therapy to address the deficits listed in the problem list box on initial evaluation, provide pt/family education and to maximize pt's level of independence in the home and community environment.     Pt's spiritual, cultural and educational needs considered and pt agreeable to plan of care and goals.     Anticipated barriers " to physical therapy: none    Goals:  SHORT TERM GOALS  1.  Patient to be independent with home exercise program to facilitate carryover between therapy visits.--Reports Compliance--MET  2.  Patient will have 8-10 degrees of plantarflexion, 10-15 degrees of eversion, and 20-25 degrees inversion range of motion right ankle for improved gait and mobility.--See Objective--MET  3.  Patient will perform straight leg raise and hold 10 seconds without quad lag on right lower extremity for increased quad control.--8 seconds--PROGRESSINg  4.  Patient will increase manual muscle test of right ankle/lower extremity to 5/5 for increased stability with gait and activiites of daily living.--See Objective--PROGRESSING     LONG TERM GOALS  1.  Patient will go up/down stairs reciprocal pattern without handrails and good eccentric control on right lower extremity.--See Objective--MET  2.  Patient will ambulate independent on all surfaces without deviation and without pain in right ankle.--MET  3.  Patient will be able to climb on and off his truck and secure/unsecure loads without pain or difficulty to be able to return to work. --Unable--PROGRESSING    Plan     Plan of care Certification: 6/14/2024 to 7/12/2024     Outpatient Physical Therapy 2 times weekly for 4 weeks to include the following interventions: Electrical Stimulation IFC/premod as needed, Gait Training, Manual Therapy, Moist Heat/ Ice, Neuromuscular Re-ed, Patient Education, Therapeutic Activities, and Therapeutic Exercise    CARLOS AL, PT   07/02/2024

## 2024-07-05 ENCOUNTER — CLINICAL SUPPORT (OUTPATIENT)
Dept: REHABILITATION | Facility: HOSPITAL | Age: 49
End: 2024-07-05

## 2024-07-05 DIAGNOSIS — M25.471 SWELLING OF JOINT, ANKLE, RIGHT: ICD-10-CM

## 2024-07-05 DIAGNOSIS — R26.9 GAIT DISTURBANCE: ICD-10-CM

## 2024-07-05 DIAGNOSIS — M25.671 STIFFNESS OF RIGHT ANKLE JOINT: ICD-10-CM

## 2024-07-05 DIAGNOSIS — Z87.81 STATUS POST ORIF OF FRACTURE OF ANKLE: Primary | ICD-10-CM

## 2024-07-05 DIAGNOSIS — Z98.890 STATUS POST ORIF OF FRACTURE OF ANKLE: Primary | ICD-10-CM

## 2024-07-05 PROCEDURE — 97112 NEUROMUSCULAR REEDUCATION: CPT | Mod: CQ

## 2024-07-05 PROCEDURE — 97110 THERAPEUTIC EXERCISES: CPT | Mod: CQ

## 2024-07-05 PROCEDURE — 97530 THERAPEUTIC ACTIVITIES: CPT | Mod: CQ

## 2024-07-05 NOTE — PROGRESS NOTES
[facility]   Physical Therapy Treatment Note      Name: Pablo Tabares  Clinic Number: [id number]    Therapy Diagnosis:   Encounter Diagnoses   Name Primary?    Status post ORIF of fracture of ankle Yes    Gait disturbance     Stiffness of right ankle joint     Swelling of joint, ankle, right        Physician: Ian Dorman MD    Visit Date: 7/5/2024    Physician Orders: PT Eval and Treat   Medical Diagnosis from Referral: see  Evaluation Date: 4/22/2024  Authorization Period Expiration: 7/21/2024   Plan of Care Expiration: 7/12/2024     Date of Surgery: 1/23/2024  Visit # / Visits authorized: 21--on financial assistance  FOTO: 1/3 = 44   2/3 = 58    3/3 = 65 (visit 19--6/27/24)    Precautions: Standard   PTA Visit #: 1/5     Time In: 8:51 am  Time Out:  9:45 am  Total Billable Time:   46 individual minutes (8 min non billed)    Subjective     Pt reports: no complaints on arrival  He was compliant with home exercise program.  Response to previous treatment: no complaints   Functional change: no change noted    Pain: 0/10  Location: right ankle     Objective      Range of motion:  Motion Right Left    Ankle DF +10 degrees   +8 degrees    Ankle PF   60 degrees   WITHIN FUNCTIONAL LIMITS   Ankle Inversion 32 degrees   35 degrees    Ankle Eversion 20 degrees   18 degrees      Manual muscle test   Muscle Right  Left    Ankle DF  MMT strength: 4+/5  MMT strength: 5/5   Ankle PF  MMT strength: 5/5  MMT strength: 5/5   Ankle inversion  MMT strength: 4-/5  MMT strength: 5/5   Ankle eversion  MMT strength: 4-/5  MMT strength: 5/5         Gait:  Weight bearing precautions: WBAT  Assistive device: none  Ambulation distance and deviations: community distance without deviation  Stairs: Manages steps with reciprocal pattern and no handrails    Treatment     Pablo received the treatments listed below:      therapeutic exercises to develop strength, endurance, ROM, flexibility, posture, and core  "stabilization for 23 minutes including:  Bike x 5 minutes   Slant board-- x 30 sec - Gastroc  Seated soleus raise--8 plates--3 x 15  Slant board soleus stretch-- x 30 seconds  Heel drop stretch--  SL calf raise against wall-3 x 10  3 way calf raises - x 15 each way--15# DB  Anterior ankle stretch--4 x 20 sec  Pro stretch 5 x 10sh each way    (Not Today)  Eccentric 2 to 1 calf raises--    manual therapy techniques: Joint mobilizations, Manual traction, Myofacial release, Soft tissue Mobilization, and Friction Massage were applied to the: R ankle for 0 minutes, including:  -  (Not Today)  Jalil barnett    neuromuscular re-education activities to improve: Balance, Coordination, Kinesthetic Sense, Proprioception, and Posture for  13 minutes. The following activities were included:  Heel walks--2 laps  Toe walks--2 laps  SL rocking on [employer] Energy[employer]ble board--PF/DF/Inv/Ev--  TRX split squats with sol raise--3 x 10    (Not Today)  Single leg stance on Bosu - flat - x 3 min total (5-10 sec intervals)  Multiaxis ankle in eversion/inversion x 30 each way  Multiaxis ankle in plantarflexion/dorsiflexion x 30 each way  SLS on blue pad with reaching out of CARLOZ    therapeutic activities to improve functional performance for 10 minutes, including:  Lateral step downs on slant board--Eversion and Inversion--2 x 10 each way  Curtsy lunges 2 x 10 each side with black cord  High step ups - 20" box - 3 x 10     (Not today)  Squats on slant board 3 x 10  12" step ups with gray band pulling ankle into eversion/inversion--10 each  High march cross overs--blue sports cord on L ankle--20 x   Unilateral calf raises on leg press - 8 plates 3 x 15  Calf raises on hack squat 70# x 30- unilateral   Unilateral leg press 7 plates x 30    Patient Education and Home Exercises       Education provided:   - review of home exercise program and current Plan of Care/rationale of treatment.    Written Home Exercises Provided: Patient instructed to cont prior HEP. " "Exercises were reviewed and Pablo was able to demonstrate them prior to the end of the session.  Pablo demonstrated good understanding of the education provided. See EMR under Patient Instructions for exercises provided during therapy sessions    Assessment     At Evaluation:  Pablo is a 48 y.o. male referred to outpatient Physical Therapy with a medical diagnosis of s/p ORIF right ankle. Patient presents with typical postop symptoms of marked swelling, decreased range of motion, decreased strength, gait disturbance, pain w/ ambulation, and inability to return to work yet. He is very limited with dorsiflexion/eversion/inversion and has the most pain with eversion/inversion. He ambulates with toe out gait due to decreased dorsiflexion range of motion. Corrected how he was descending stairs today. 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     Current Assessment:  Arrived with a decrease in anterior ankle soreness.  Able to do 20" step ups without hand held assist today. Reports some scar tightness. Pablo continues to work hard to reach functional goals. He is progressing well with eversion strength and ability to step up high for return to work as .  Will continue Plan of Care to address functional deficits. Will DC at end of POC.      Pablo Is progressing towards his goals.   Pt prognosis is Good.     Pt will continue to benefit from skilled outpatient physical therapy to address the deficits listed in the problem list box on initial evaluation, provide pt/family education and to maximize pt's level of independence in the home and community environment.     Pt's spiritual, cultural and educational needs considered and pt agreeable to plan of care and goals.     Anticipated barriers to physical therapy: none    Goals:  SHORT TERM GOALS  1.  Patient to be independent with home exercise " program to facilitate carryover between therapy visits.--Reports Compliance--MET  2.  Patient will have 8-10 degrees of plantarflexion, 10-15 degrees of eversion, and 20-25 degrees inversion range of motion right ankle for improved gait and mobility.--See Objective--MET  3.  Patient will perform straight leg raise and hold 10 seconds without quad lag on right lower extremity for increased quad control.--8 seconds--PROGRESSINg  4.  Patient will increase manual muscle test of right ankle/lower extremity to 5/5 for increased stability with gait and activiites of daily living.--See Objective--PROGRESSING     LONG TERM GOALS  1.  Patient will go up/down stairs reciprocal pattern without handrails and good eccentric control on right lower extremity.--See Objective--MET  2.  Patient will ambulate independent on all surfaces without deviation and without pain in right ankle.--MET  3.  Patient will be able to climb on and off his truck and secure/unsecure loads without pain or difficulty to be able to return to work. --Unable--PROGRESSING    Plan     Plan of care Certification: 6/14/2024 to 7/12/2024     Outpatient Physical Therapy 2 times weekly for 4 weeks to include the following interventions: Electrical Stimulation IFC/premod as needed, Gait Training, Manual Therapy, Moist Heat/ Ice, Neuromuscular Re-ed, Patient Education, Therapeutic Activities, and Therapeutic Exercise    Braulio Moreland, KIRSTEN   07/05/2024

## 2024-07-09 ENCOUNTER — CLINICAL SUPPORT (OUTPATIENT)
Dept: REHABILITATION | Facility: HOSPITAL | Age: 49
End: 2024-07-09

## 2024-07-09 DIAGNOSIS — Z87.81 STATUS POST ORIF OF FRACTURE OF ANKLE: Primary | ICD-10-CM

## 2024-07-09 DIAGNOSIS — M25.471 SWELLING OF JOINT, ANKLE, RIGHT: ICD-10-CM

## 2024-07-09 DIAGNOSIS — M25.671 STIFFNESS OF RIGHT ANKLE JOINT: ICD-10-CM

## 2024-07-09 DIAGNOSIS — R26.9 GAIT DISTURBANCE: ICD-10-CM

## 2024-07-09 DIAGNOSIS — Z98.890 STATUS POST ORIF OF FRACTURE OF ANKLE: Primary | ICD-10-CM

## 2024-07-09 PROCEDURE — 97110 THERAPEUTIC EXERCISES: CPT

## 2024-07-09 PROCEDURE — 97530 THERAPEUTIC ACTIVITIES: CPT

## 2024-07-09 PROCEDURE — 97112 NEUROMUSCULAR REEDUCATION: CPT

## 2024-07-09 NOTE — PROGRESS NOTES
[facility]   Physical Therapy Treatment Note      Name: Pabol Tabares  Clinic Number: [id number]    Therapy Diagnosis:   Encounter Diagnoses   Name Primary?    Status post ORIF of fracture of ankle Yes    Gait disturbance     Stiffness of right ankle joint     Swelling of joint, ankle, right        Physician: Ian Dorman MD    Visit Date: 7/9/2024    Physician Orders: PT Eval and Treat   Medical Diagnosis from Referral: see  Evaluation Date: 4/22/2024  Authorization Period Expiration: 7/21/2024   Plan of Care Expiration: 7/12/2024     Date of Surgery: 1/23/2024  Visit # / Visits authorized: 22--on financial assistance  FOTO: 1/3 = 44   2/3 = 58    3/3 = 65 (visit 19--6/27/24)    Precautions: Standard   PTA Visit #: 1/5     Time In: 100 pm  Time Out:  200    Total Billable Time:  60 individual minutes      Subjective     Pt reports: no complaints on arrival    He was compliant with home exercise program.  Response to previous treatment: no complaints   Functional change: no change noted    Pain: 0/10  Location: right ankle     Objective      Range of motion:  Motion Right Left    Ankle DF +10 degrees   +8 degrees    Ankle PF   60 degrees   WITHIN FUNCTIONAL LIMITS   Ankle Inversion 32 degrees   35 degrees    Ankle Eversion 20 degrees   18 degrees      Manual muscle test   Muscle Right  Left    Ankle DF  MMT strength: 4+/5  MMT strength: 5/5   Ankle PF  MMT strength: 5/5  MMT strength: 5/5   Ankle inversion  MMT strength: 4-/5  MMT strength: 5/5   Ankle eversion  MMT strength: 4-/5  MMT strength: 5/5         Gait:  Weight bearing precautions: WBAT  Assistive device: none  Ambulation distance and deviations: community distance without deviation  Stairs: Manages steps with reciprocal pattern and no handrails    Treatment     Pablo received the treatments listed below:      therapeutic exercises to develop strength, endurance, ROM, flexibility, posture, and core stabilization for 25  "minutes including:  Bike x 5 minutes   Slant board-- x 30 sec - Gastroc  Seated soleus raise--8 plates--3 x 15  Slant board soleus stretch-- x 30 seconds  Heel drop stretch--  SL calf raise against wall-3 x 10  3 way calf raises - x 15 each way--15# DB  Anterior ankle stretch--4 x 20 sec  Pro stretch 5 x 10sh each way    (Not Today)  Eccentric 2 to 1 calf raises--    manual therapy techniques: Joint mobilizations, Manual traction, Myofacial release, Soft tissue Mobilization, and Friction Massage were applied to the: R ankle for 0 minutes, including:  -  (Not Today)  Jalil barnett    neuromuscular re-education activities to improve: Balance, Coordination, Kinesthetic Sense, Proprioception, and Posture for  13 minutes. The following activities were included:  Heel walks--2 laps  Toe walks--2 laps  SL rocking on [employer] board--PF/DF/Inv/Ev--  TRX split squats with sol raise--3 x 10    (Not Today)  Single leg stance on Bosu - flat - x 3 min total (5-10 sec intervals)  Multiaxis ankle in eversion/inversion x 30 each way  Multiaxis ankle in plantarflexion/dorsiflexion x 30 each way  SLS on blue pad with reaching out of CARLOZ    therapeutic activities to improve functional performance for 10 minutes, including:  Lateral step downs on slant board--Eversion and Inversion--2 x 10 each way  Curtsy lunges 2 x 10 each side with black cord  High step ups - 20" box - 3 x 10     (Not today)  Squats on slant board 3 x 10  12" step ups with gray band pulling ankle into eversion/inversion--10 each  High march cross overs--blue sports cord on L ankle--20 x   Unilateral calf raises on leg press - 8 plates 3 x 15  Calf raises on hack squat 70# x 30- unilateral   Unilateral leg press 7 plates x 30    Patient Education and Home Exercises       Education provided:   - review of home exercise program and current Plan of Care/rationale of treatment.    Written Home Exercises Provided: Patient instructed to cont prior HEP. Exercises were reviewed " "and Pablo was able to demonstrate them prior to the end of the session.  Pablo demonstrated good understanding of the education provided. See EMR under Patient Instructions for exercises provided during therapy sessions    Assessment     At Evaluation:  Pablo is a 48 y.o. male referred to outpatient Physical Therapy with a medical diagnosis of s/p ORIF right ankle. Patient presents with typical postop symptoms of marked swelling, decreased range of motion, decreased strength, gait disturbance, pain w/ ambulation, and inability to return to work yet. He is very limited with dorsiflexion/eversion/inversion and has the most pain with eversion/inversion. He ambulates with toe out gait due to decreased dorsiflexion range of motion. Corrected how he was descending stairs today. 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     Current Assessment:   Able to do 20" step ups without hand held assist today. Reports some scar tightness. Pablo continues to work hard to reach functional goals. He is progressing well with eversion strength and ability to step up high for return to work as .  Will continue Plan of Care to address functional deficits. Will DC at end of POC.      Pablo Is progressing towards his goals.   Pt prognosis is Good.     Pt will continue to benefit from skilled outpatient physical therapy to address the deficits listed in the problem list box on initial evaluation, provide pt/family education and to maximize pt's level of independence in the home and community environment.     Pt's spiritual, cultural and educational needs considered and pt agreeable to plan of care and goals.     Anticipated barriers to physical therapy: none    Goals:  SHORT TERM GOALS  1.  Patient to be independent with home exercise program to facilitate carryover between therapy visits.--Reports " Compliance--MET  2.  Patient will have 8-10 degrees of plantarflexion, 10-15 degrees of eversion, and 20-25 degrees inversion range of motion right ankle for improved gait and mobility.--See Objective--MET  3.  Patient will perform straight leg raise and hold 10 seconds without quad lag on right lower extremity for increased quad control.--8 seconds--PROGRESSINg  4.  Patient will increase manual muscle test of right ankle/lower extremity to 5/5 for increased stability with gait and activiites of daily living.--See Objective--PROGRESSING     LONG TERM GOALS  1.  Patient will go up/down stairs reciprocal pattern without handrails and good eccentric control on right lower extremity.--See Objective--MET  2.  Patient will ambulate independent on all surfaces without deviation and without pain in right ankle.--MET  3.  Patient will be able to climb on and off his truck and secure/unsecure loads without pain or difficulty to be able to return to work. --Unable--PROGRESSING    Plan     Plan of care Certification: 6/14/2024 to 7/12/2024     Outpatient Physical Therapy 2 times weekly for 4 weeks to include the following interventions: Electrical Stimulation IFC/premod as needed, Gait Training, Manual Therapy, Moist Heat/ Ice, Neuromuscular Re-ed, Patient Education, Therapeutic Activities, and Therapeutic Exercise    JAROD CHURCH, PT   07/09/2024

## 2024-07-11 ENCOUNTER — CLINICAL SUPPORT (OUTPATIENT)
Dept: REHABILITATION | Facility: HOSPITAL | Age: 49
End: 2024-07-11

## 2024-07-11 DIAGNOSIS — M25.671 STIFFNESS OF RIGHT ANKLE JOINT: ICD-10-CM

## 2024-07-11 DIAGNOSIS — R26.9 GAIT DISTURBANCE: ICD-10-CM

## 2024-07-11 DIAGNOSIS — Z87.81 STATUS POST ORIF OF FRACTURE OF ANKLE: Primary | ICD-10-CM

## 2024-07-11 DIAGNOSIS — M25.471 SWELLING OF JOINT, ANKLE, RIGHT: ICD-10-CM

## 2024-07-11 DIAGNOSIS — Z98.890 STATUS POST ORIF OF FRACTURE OF ANKLE: Primary | ICD-10-CM

## 2024-07-11 PROCEDURE — 97112 NEUROMUSCULAR REEDUCATION: CPT | Mod: CQ

## 2024-07-11 PROCEDURE — 97110 THERAPEUTIC EXERCISES: CPT | Mod: CQ

## 2024-07-11 PROCEDURE — 97530 THERAPEUTIC ACTIVITIES: CPT | Mod: CQ

## 2024-07-11 NOTE — PROGRESS NOTES
[facility]   Physical Therapy Treatment  and Discharge Note      Name: Pablo Tabares  Clinic Number: [id number]    Therapy Diagnosis:   Encounter Diagnoses   Name Primary?    Status post ORIF of fracture of ankle Yes    Gait disturbance     Stiffness of right ankle joint     Swelling of joint, ankle, right        Physician: Ian Dorman MD    Visit Date: 7/11/2024    Physician Orders: PT Eval and Treat   Medical Diagnosis from Referral: see  Evaluation Date: 4/22/2024  Authorization Period Expiration: 7/21/2024   Plan of Care Expiration: 7/12/2024     Date of Surgery: 1/23/2024  Visit # / Visits authorized: 23--on financial assistance  FOTO: 1/3 = 44   2/3 = 58    3/3 = 65 (visit 19--6/27/24)   DC = 76    Precautions: Standard   PTA Visit #: 2/5     Time In: 3:15 pm  Time Out:  4:05 pm  Total Billable Time:  50 individual minutes      Subjective     Pt reports: no complaints on arrival    He was compliant with home exercise program.  Response to previous treatment: no complaints   Functional change: no change noted    Pain: 0/10  Location: right ankle     Objective      Range of motion:  Motion Right Left    Ankle DF +10 degrees   +8 degrees    Ankle PF   60 degrees   WITHIN FUNCTIONAL LIMITS   Ankle Inversion 32 degrees   35 degrees    Ankle Eversion 20 degrees   20 degrees      Manual muscle test   Muscle Right  Left    Ankle DF  MMT strength: 5/5  MMT strength: 5/5   Ankle PF  MMT strength: 5/5  MMT strength: 5/5   Ankle inversion  MMT strength: 5/5  MMT strength: 5/5   Ankle eversion  MMT strength: 5/5  MMT strength: 5/5         Gait:  Weight bearing precautions: WBAT  Assistive device: none  Ambulation distance and deviations: community distance without deviation  Stairs: Manages steps with reciprocal pattern and no handrails    Treatment     Pablo received the treatments listed below:      therapeutic exercises to develop strength, endurance, ROM, flexibility, posture, and  "core stabilization for  27 minutes including:  Bike x 5 minutes   Slant board-- x 30 sec - Gastroc  Seated soleus raise--8 plates--3 x 15  Slant board soleus stretch-- x 30 seconds  Heel drop stretch--  SL calf raise against wall-3 x 10  3 way calf raises - x 15 each way--15# DB  Anterior ankle stretch--4 x 20 sec  Pro stretch 5 x 10sh each way    (Not Today)  Eccentric 2 to 1 calf raises--    manual therapy techniques: Joint mobilizations, Manual traction, Myofacial release, Soft tissue Mobilization, and Friction Massage were applied to the: R ankle for 0 minutes, including:  -  (Not Today)  Jalil barnett    neuromuscular re-education activities to improve: Balance, Coordination, Kinesthetic Sense, Proprioception, and Posture for  13 minutes. The following activities were included:  Heel walks--2 laps  Toe walks--2 laps  SL rocking on [employer] board--PF/DF/Inv/Ev--  TRX split squats with sol raise--3 x 10    (Not Today)  Single leg stance on Bosu - flat - x 3 min total (5-10 sec intervals)  Multiaxis ankle in eversion/inversion x 30 each way  Multiaxis ankle in plantarflexion/dorsiflexion x 30 each way  SLS on blue pad with reaching out of CARLOZ    therapeutic activities to improve functional performance for 10 minutes, including:  Lateral step downs on slant board--Eversion and Inversion--2 x 10 each way  Curtsy lunges 2 x 10 each side with black cord  High step ups - 20" box - 3 x 10     (Not today)  Squats on slant board 3 x 10  12" step ups with gray band pulling ankle into eversion/inversion--10 each  High march cross overs--blue sports cord on L ankle--20 x   Unilateral calf raises on leg press - 8 plates 3 x 15  Calf raises on hack squat 70# x 30- unilateral   Unilateral leg press 7 plates x 30    Patient Education and Home Exercises       Education provided:   - review of home exercise program and current Plan of Care/rationale of treatment.    Written Home Exercises Provided: Patient instructed to cont prior " HEP. Exercises were reviewed and Pablo was able to demonstrate them prior to the end of the session.  Pablo demonstrated good understanding of the education provided. See EMR under Patient Instructions for exercises provided during therapy sessions    Assessment     At Evaluation:  Pablo is a 48 y.o. male referred to outpatient Physical Therapy with a medical diagnosis of s/p ORIF right ankle. Patient presents with typical postop symptoms of marked swelling, decreased range of motion, decreased strength, gait disturbance, pain w/ ambulation, and inability to return to work yet. He is very limited with dorsiflexion/eversion/inversion and has the most pain with eversion/inversion. He ambulates with toe out gait due to decreased dorsiflexion range of motion. Corrected how he was descending stairs today. 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     Current Assessment:  Patient arrived without pain or stiffness in  ankle.  His ROM and strength have returned to WNL.  He is able to do all ADLS and work related activities without issue.  He has met all goals and will be DC from PT today.      Pablo Is progressing towards his goals.   Pt prognosis is Good.     Pt's spiritual, cultural and educational needs considered and pt agreeable to plan of care and goals.     Anticipated barriers to physical therapy: none    Goals:  SHORT TERM GOALS  1.  Patient to be independent with home exercise program to facilitate carryover between therapy visits.--Reports Compliance--MET  2.  Patient will have 8-10 degrees of plantarflexion, 10-15 degrees of eversion, and 20-25 degrees inversion range of motion right ankle for improved gait and mobility.--See Objective--MET  3.  Patient will perform straight leg raise and hold 10 seconds without quad lag on right lower extremity for increased quad control.--> 10  seconds--MET  4.  Patient will increase manual muscle test of right ankle/lower extremity to 5/5 for increased stability with gait and activiites of daily living.--See Objective--MET     LONG TERM GOALS  1.  Patient will go up/down stairs reciprocal pattern without handrails and good eccentric control on right lower extremity.--See Objective--MET  2.  Patient will ambulate independent on all surfaces without deviation and without pain in right ankle.--MET  3.  Patient will be able to climb on and off his truck and secure/unsecure loads without pain or difficulty to be able to return to work. --Able--MET    Plan     Patient has met all goals and will be DC from PT with DERICK Moreland, PTA   07/11/2024

## 2024-10-01 ENCOUNTER — DOCUMENTATION ONLY (OUTPATIENT)
Dept: REHABILITATION | Facility: HOSPITAL | Age: 49
End: 2024-10-01

## 2024-10-01 PROBLEM — M25.671 STIFFNESS OF RIGHT ANKLE JOINT: Status: RESOLVED | Noted: 2024-04-23 | Resolved: 2024-10-01

## 2024-10-01 PROBLEM — Z87.81 STATUS POST ORIF OF FRACTURE OF ANKLE: Status: RESOLVED | Noted: 2024-04-23 | Resolved: 2024-10-01

## 2024-10-01 PROBLEM — Z98.890 STATUS POST ORIF OF FRACTURE OF ANKLE: Status: RESOLVED | Noted: 2024-04-23 | Resolved: 2024-10-01

## 2024-10-01 PROBLEM — R26.9 GAIT DISTURBANCE: Status: RESOLVED | Noted: 2024-04-23 | Resolved: 2024-10-01

## 2024-10-01 PROBLEM — M25.471: Status: RESOLVED | Noted: 2024-04-23 | Resolved: 2024-10-01

## 2024-10-01 NOTE — PROGRESS NOTES
[facility]   Physical Therapy Discharge Summary      Name: Pablo Tabares  Clinic Number: [id number]     Therapy Diagnosis:        Encounter Diagnoses   Name Primary?    Status post ORIF of fracture of ankle Yes    Gait disturbance      Stiffness of right ankle joint      Swelling of joint, ankle, right           Physician: Ian Dorman MD     Last Visit Date: 7/11/2024     Physician Orders: PT Eval and Treat   Medical Diagnosis from Referral: see  Evaluation Date: 4/22/2024  Authorization Period Expiration: 7/21/2024   Plan of Care Expiration: 7/12/2024     Date of Surgery: 1/23/2024  Visit # / Visits authorized: 23--on financial assistance  FOTO: 1/3 = 44              2/3 = 58               3/3 = 65 (visit 19--6/27/24)              DC = 76     Precautions: Standard     Last physical therapy treatment performed by Braulio Moreland PTA.    Range of motion:  Motion Right Left    Ankle DF +10 degrees   +8 degrees    Ankle PF   60 degrees   WITHIN FUNCTIONAL LIMITS   Ankle Inversion 32 degrees   35 degrees    Ankle Eversion 20 degrees   20 degrees       Manual muscle test   Muscle Right  Left    Ankle DF  MMT strength: 5/5  MMT strength: 5/5   Ankle PF  MMT strength: 5/5  MMT strength: 5/5   Ankle inversion  MMT strength: 5/5  MMT strength: 5/5   Ankle eversion  MMT strength: 5/5  MMT strength: 5/5         Gait:  Weight bearing precautions: WBAT  Assistive device: none  Ambulation distance and deviations: community distance without deviation  Stairs: Manages steps with reciprocal pattern and no handrails      Patient arrived without pain or stiffness in ankle. His ROM and strength have returned to WNL. He is able to do all ADLS and work related activities without issue. He has met all goals and will be DC from PT today.    Goals:  SHORT TERM GOALS  1.  Patient to be independent with home exercise program to facilitate carryover between therapy visits.--Reports Compliance--MET  2.  Patient  will have 8-10 degrees of plantarflexion, 10-15 degrees of eversion, and 20-25 degrees inversion range of motion right ankle for improved gait and mobility.--See Objective--MET  3.  Patient will perform straight leg raise and hold 10 seconds without quad lag on right lower extremity for increased quad control.--> 10 seconds--MET  4.  Patient will increase manual muscle test of right ankle/lower extremity to 5/5 for increased stability with gait and activiites of daily living.--See Objective--MET     LONG TERM GOALS  1.  Patient will go up/down stairs reciprocal pattern without handrails and good eccentric control on right lower extremity.--See Objective--MET  2.  Patient will ambulate independent on all surfaces without deviation and without pain in right ankle.--MET  3.  Patient will be able to climb on and off his truck and secure/unsecure loads without pain or difficulty to be able to return to work. --Able--MET    Plan:  Patient is discharged from physical therapy effective 7/11/2024.    CARLOS AL, PT  10/1/2024

## 2024-10-30 ENCOUNTER — PATIENT MESSAGE (OUTPATIENT)
Dept: RESEARCH | Facility: HOSPITAL | Age: 49
End: 2024-10-30

## 2024-12-05 ENCOUNTER — CLINICAL SUPPORT (OUTPATIENT)
Dept: PRIMARY CARE CLINIC | Facility: CLINIC | Age: 49
End: 2024-12-05

## 2024-12-05 DIAGNOSIS — Z02.4 ENCOUNTER FOR DEPARTMENT OF TRANSPORTATION (DOT) EXAMINATION FOR DRIVING LICENSE RENEWAL: Primary | ICD-10-CM

## 2024-12-05 NOTE — PROGRESS NOTES
Patient ID: Pablo Tabares is a 49 y.o. male.    Chief Complaint: No chief complaint on file.    History of Present Illness              Physical Exam              Assessment & Plan               1. Encounter for Department of Transportation (DOT) examination for driving license renewal        No follow-ups on file.    This note was generated with the assistance of ambient listening technology. Verbal consent was obtained by the patient and accompanying visitor(s) for the recording of patient appointment to facilitate this note. I attest to having reviewed and edited the generated note for accuracy, though some syntax or spelling errors may persist. Please contact the author of this note for any clarification.

## (undated) DEVICE — Device

## (undated) DEVICE — BAG RECTANGLE RBBRBND 30X36IN

## (undated) DEVICE — DRAPE C-ARMOR EQUIPMENT COVER

## (undated) DEVICE — GLOVE 7.0 PROTEXIS PI BLUE

## (undated) DEVICE — GLOVE SURGICAL PROTEXIS PI SIZE 7.5

## (undated) DEVICE — SUTURE VICRYL 4-0 FS2 UNDYED 27 IN

## (undated) DEVICE — SUT ETHILON 3-0 PS2 18 BLK

## (undated) DEVICE — PAD CAST SPECIALIST STRL 3

## (undated) DEVICE — GLOVE 8.5 PROTEXIS PI BLUE

## (undated) DEVICE — TOURNIQUET SB QC SP 34X4IN

## (undated) DEVICE — BIT DRILL AO 2.6X135MM SCALED

## (undated) DEVICE — APPLICATOR CHLORAPREP LITE ORANGE 10.5ML STERILE

## (undated) DEVICE — GLOVE BIOGEL SKINSENSE PI 6.5

## (undated) DEVICE — GLOVE 6.5 PROTEXIS PI BLUE

## (undated) DEVICE — NEEDLE ECLIPSE 25GX1 1/2 IN

## (undated) DEVICE — STOCKINETTE TUBULAR 2PL 6 X 4

## (undated) DEVICE — CAST LARGE OR FROM CAST CART

## (undated) DEVICE — ADHESIVE SKIN CLOSURE EXOFIN MICRO HV 1ML

## (undated) DEVICE — APPLICATOR CHLORAPREP ORN 26ML

## (undated) DEVICE — SOL IRRIGATION SALINE 0.9% 1000ML BOTTLE

## (undated) DEVICE — SYRINGE 10-12CC LURE -LOK TIP

## (undated) DEVICE — SUT 2-0 VICRYL / CT-1

## (undated) DEVICE — STAPLER SKIN WIDE

## (undated) DEVICE — GLOVE BIOGEL SKINSENSE PI 8.0

## (undated) DEVICE — SUPPORT HEADREST DO-NUT

## (undated) DEVICE — GLOVE SURGICAL PROTEXIS PI SIZE 6.5

## (undated) DEVICE — SOL NACL IRR 1000ML BTL

## (undated) DEVICE — BANDAGE ESMARK 6INX3YD

## (undated) DEVICE — GOWN NONREINF SET-IN SLV 2XL

## (undated) DEVICE — CDS ENT